# Patient Record
Sex: MALE | Race: WHITE
[De-identification: names, ages, dates, MRNs, and addresses within clinical notes are randomized per-mention and may not be internally consistent; named-entity substitution may affect disease eponyms.]

---

## 2017-01-03 ENCOUNTER — HOSPITAL ENCOUNTER (OUTPATIENT)
Dept: HOSPITAL 83 - WOUNDCARE | Age: 47
End: 2017-01-03
Attending: PODIATRIST
Payer: MEDICARE

## 2017-01-03 DIAGNOSIS — L88: ICD-10-CM

## 2017-01-03 DIAGNOSIS — L97.811: ICD-10-CM

## 2017-01-03 DIAGNOSIS — I87.011: Primary | ICD-10-CM

## 2017-03-02 LAB
ABSOLUTE BASO #: 0 10*3/UL (ref 0–0.1)
ABSOLUTE EOS #: 0.1 10*3/UL (ref 0–0.4)
ABSOLUTE NEUT #: 7.4 10*3/UL (ref 2.3–7.9)
ALBUMIN: 4 GM/DL (ref 3.1–4.5)
ALP BLD-CCNC: 76 U/L (ref 45–117)
ALT SERPL-CCNC: 22 U/L (ref 12–78)
AST SERPL-CCNC: 15 IU/L (ref 3–35)
BASOPHILS %: 0.3 % (ref 0–1)
BILIRUB SERPL-MCNC: 0.3 MG/DL (ref 0.2–1)
BUN BLDV-MCNC: 11 MG/DL (ref 7–24)
C-REACTIVE PROTEIN: 0.37 MG/DL (ref 0–0.3)
CALCIUM SERPL-MCNC: 8.9 MG/DL (ref 8.5–10.5)
CHLORIDE BLD-SCNC: 99 MMOL/L (ref 98–107)
CO2: 31 MMOL/L (ref 21–32)
CREAT SERPL-MCNC: 1.07 MG/DL (ref 0.7–1.3)
EOSINOPHILS %: 0.8 % (ref 1–4)
ERYTHROCYTE SEDIMENTATION RATE: 13 MM/HR (ref 0–15)
GFR AFRICAN AMERICAN: > 60 ML/MIN
GFR SERPL CREATININE-BSD FRML MDRD: >60 ML/MIN/
GLUCOSE: 174 MG/DL (ref 65–99)
HCT VFR BLD CALC: 39 % (ref 42–52)
HEMOGLOBIN: 13.1 G/DL (ref 14–18)
IMMATURE GRANULOCYTES #: 0.1 10*3/UL (ref 0–0.1)
IMMATURE GRANULOCYTES: 0.9 % (ref 0–1)
LYMPHOCYTE %: 15.8 % (ref 27–41)
LYMPHOCYTES # BLD: 1.5 10*3/UL (ref 1.3–4.4)
MCH RBC QN AUTO: 27.6 PG (ref 27–31)
MCHC RBC AUTO-ENTMCNC: 33.6 G/DL (ref 33–37)
MCV RBC AUTO: 82.1 FL (ref 80–94)
MONOCYTES # BLD: 0.5 10*3/UL (ref 0.1–1)
MONOCYTES %: 5.3 % (ref 3–9)
NEUTROPHILS %: 76.9 % (ref 47–73)
NUCLEATED RED BLOOD CELLS: 0 % (ref 0–0)
PDW BLD-RTO: 13.6 % (ref 0–14.5)
PLATELET # BLD: 244 10*3/UL (ref 130–400)
PMV BLD AUTO: 8.9 FL (ref 9.6–12.3)
POTASSIUM SERPL-SCNC: 4.3 MMOL/L (ref 3.5–5.1)
RBC # BLD: 4.75 10*6/UL (ref 4.5–5.9)
SODIUM BLD-SCNC: 139 MMOL/L (ref 136–145)
TOTAL PROTEIN: 7 GM/DL (ref 6.4–8.2)
WBC # BLD: 9.6 10*3/UL (ref 4.8–10.8)

## 2017-03-06 LAB
ANAEROBIC CULTURE: NORMAL

## 2017-03-17 PROBLEM — Z95.2 H/O MITRAL VALVE REPLACEMENT WITH MECHANICAL VALVE: Chronic | Status: ACTIVE | Noted: 2017-03-17

## 2017-04-17 ENCOUNTER — HOSPITAL ENCOUNTER (EMERGENCY)
Dept: HOSPITAL 83 - ED | Age: 47
LOS: 1 days | Discharge: HOME | End: 2017-04-18
Payer: MEDICARE

## 2017-04-17 VITALS — WEIGHT: 250 LBS | HEIGHT: 72.99 IN | BODY MASS INDEX: 33.13 KG/M2

## 2017-04-17 VITALS — DIASTOLIC BLOOD PRESSURE: 65 MMHG

## 2017-04-17 DIAGNOSIS — Z45.2: Primary | ICD-10-CM

## 2017-04-17 DIAGNOSIS — Z79.899: ICD-10-CM

## 2017-04-17 DIAGNOSIS — Z79.02: ICD-10-CM

## 2017-04-17 DIAGNOSIS — Z79.82: ICD-10-CM

## 2017-04-17 DIAGNOSIS — Z88.8: ICD-10-CM

## 2017-04-17 DIAGNOSIS — Z88.1: ICD-10-CM

## 2017-04-17 DIAGNOSIS — Z88.6: ICD-10-CM

## 2017-04-17 LAB
ALBUMIN SERPL-MCNC: 3.9 GM/DL (ref 3.1–4.5)
ALP SERPL-CCNC: 86 U/L (ref 45–117)
ALT SERPL W P-5'-P-CCNC: 22 U/L (ref 12–78)
AST SERPL-CCNC: 25 IU/L (ref 3–35)
BASOPHILS # BLD AUTO: 0 10*3/UL (ref 0–0.1)
BASOPHILS NFR BLD AUTO: 0.4 % (ref 0–1)
BUN SERPL-MCNC: 8 MG/DL (ref 7–24)
CHLORIDE SERPL-SCNC: 105 MMOL/L (ref 98–107)
CO2 SERPL-SCNC: 28 MMOL/L (ref 21–32)
EOSINOPHIL # BLD AUTO: 0.5 10*3/UL (ref 0–0.4)
EOSINOPHIL # BLD AUTO: 4.8 % (ref 1–4)
ERYTHROCYTE [DISTWIDTH] IN BLOOD BY AUTOMATED COUNT: 13 % (ref 0–14.5)
GLUCOSE SERPL-MCNC: 149 MG/DL (ref 65–99)
HCT VFR BLD AUTO: 36 % (ref 42–52)
HGB BLD-MCNC: 12.1 G/DL (ref 14–18)
IG #: 0 10*3/UL (ref 0–0.1)
INR BLD: 2.4 (ref 2–3.5)
LYMPHOCYTES # BLD AUTO: 1.5 10*3/UL (ref 1.3–4.4)
LYMPHOCYTES NFR BLD AUTO: 16 % (ref 27–41)
MCH RBC QN AUTO: 27.6 PG (ref 27–31)
MCHC RBC AUTO-ENTMCNC: 33.6 G/DL (ref 33–37)
MCV RBC AUTO: 82 FL (ref 80–94)
MONOCYTES # BLD AUTO: 0.6 10*3/UL (ref 0.1–1)
MONOCYTES NFR BLD MANUAL: 6.7 % (ref 3–9)
NEUT #: 6.9 10*3/UL (ref 2.3–7.9)
NEUT %: 71.9 % (ref 47–73)
NRBC BLD QL AUTO: 0 % (ref 0–0)
PLATELET # BLD AUTO: 240 10*3/UL (ref 130–400)
PMV BLD AUTO: 9.2 FL (ref 9.6–12.3)
POTASSIUM SERPL-SCNC: 3.3 MMOL/L (ref 3.5–5.1)
PROT SERPL-MCNC: 7 GM/DL (ref 6.4–8.2)
PROTHROMBIN TIME: 26.8 SECONDS (ref 9–12.4)
RBC # BLD AUTO: 4.39 10*6/UL (ref 4.5–5.9)
SODIUM SERPL-SCNC: 145 MMOL/L (ref 136–145)
VANCOMYCIN TROUGH SERPL-MCNC: 16 UG/ML (ref 10–20)
WBC NRBC COR # BLD AUTO: 9.6 10*3/UL (ref 4.8–10.8)

## 2017-04-18 ENCOUNTER — HOSPITAL ENCOUNTER (OUTPATIENT)
Dept: HOSPITAL 83 - SDC | Age: 47
Discharge: HOME | End: 2017-04-18
Attending: INTERNAL MEDICINE
Payer: MEDICARE

## 2017-04-18 VITALS — SYSTOLIC BLOOD PRESSURE: 138 MMHG | DIASTOLIC BLOOD PRESSURE: 76 MMHG

## 2017-04-18 DIAGNOSIS — Z82.49: ICD-10-CM

## 2017-04-18 DIAGNOSIS — Z83.3: ICD-10-CM

## 2017-04-18 DIAGNOSIS — F32.9: ICD-10-CM

## 2017-04-18 DIAGNOSIS — K21.9: ICD-10-CM

## 2017-04-18 DIAGNOSIS — Z87.01: ICD-10-CM

## 2017-04-18 DIAGNOSIS — Z45.2: Primary | ICD-10-CM

## 2017-04-18 DIAGNOSIS — E11.9: ICD-10-CM

## 2017-04-18 DIAGNOSIS — J44.9: ICD-10-CM

## 2017-04-18 DIAGNOSIS — F41.9: ICD-10-CM

## 2017-04-27 ENCOUNTER — HOSPITAL ENCOUNTER (OUTPATIENT)
Dept: HOSPITAL 83 - WOUNDCARE | Age: 47
End: 2017-04-27
Attending: INTERNAL MEDICINE
Payer: MEDICARE

## 2017-04-27 DIAGNOSIS — F17.210: ICD-10-CM

## 2017-04-27 DIAGNOSIS — F41.9: ICD-10-CM

## 2017-04-27 DIAGNOSIS — L97.814: ICD-10-CM

## 2017-04-27 DIAGNOSIS — Z95.0: ICD-10-CM

## 2017-04-27 DIAGNOSIS — E11.69: ICD-10-CM

## 2017-04-27 DIAGNOSIS — I48.91: ICD-10-CM

## 2017-04-27 DIAGNOSIS — J44.9: ICD-10-CM

## 2017-04-27 DIAGNOSIS — E11.622: Primary | ICD-10-CM

## 2017-04-27 DIAGNOSIS — Z79.01: ICD-10-CM

## 2017-04-27 DIAGNOSIS — F32.9: ICD-10-CM

## 2017-04-27 DIAGNOSIS — G43.909: ICD-10-CM

## 2017-04-27 DIAGNOSIS — M86.361: ICD-10-CM

## 2017-08-01 ENCOUNTER — HOSPITAL ENCOUNTER (OUTPATIENT)
Dept: HOSPITAL 83 - WOUNDCARE | Age: 47
End: 2017-08-01
Attending: INTERNAL MEDICINE
Payer: MEDICARE

## 2017-08-01 DIAGNOSIS — M86.361: ICD-10-CM

## 2017-08-01 DIAGNOSIS — L88: ICD-10-CM

## 2017-08-01 DIAGNOSIS — E11.69: ICD-10-CM

## 2017-08-01 DIAGNOSIS — L97.814: ICD-10-CM

## 2017-08-01 DIAGNOSIS — E11.622: Primary | ICD-10-CM

## 2017-08-11 ENCOUNTER — HOSPITAL ENCOUNTER (OUTPATIENT)
Dept: HOSPITAL 83 - LAB | Age: 47
Discharge: HOME | End: 2017-08-11
Attending: INTERNAL MEDICINE
Payer: MEDICARE

## 2017-08-11 DIAGNOSIS — I48.91: Primary | ICD-10-CM

## 2017-08-11 LAB
INR BLD: 3.1 (ref 2–3.5)
PROTHROMBIN TIME: 35.5 SECONDS (ref 9–12.4)

## 2017-09-25 ENCOUNTER — HOSPITAL ENCOUNTER (OUTPATIENT)
Dept: HOSPITAL 83 - LAB | Age: 47
Discharge: HOME | End: 2017-09-25
Attending: INTERNAL MEDICINE
Payer: MEDICARE

## 2017-09-25 DIAGNOSIS — I48.91: Primary | ICD-10-CM

## 2017-09-25 LAB — INR BLD: 2.1 (ref 2–3.5)

## 2017-10-26 ENCOUNTER — HOSPITAL ENCOUNTER (EMERGENCY)
Dept: HOSPITAL 83 - ED | Age: 47
Discharge: HOME | End: 2017-10-26
Payer: MEDICARE

## 2017-10-26 VITALS — DIASTOLIC BLOOD PRESSURE: 74 MMHG | SYSTOLIC BLOOD PRESSURE: 112 MMHG

## 2017-10-26 VITALS — WEIGHT: 200 LBS | HEIGHT: 70 IN | BODY MASS INDEX: 28.63 KG/M2
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
## 2017-10-26 DIAGNOSIS — F17.200: ICD-10-CM

## 2017-10-26 DIAGNOSIS — Z88.1: ICD-10-CM

## 2017-10-26 DIAGNOSIS — T15.92XA: Primary | ICD-10-CM

## 2017-10-26 DIAGNOSIS — Z88.8: ICD-10-CM
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
## 2017-10-26 DIAGNOSIS — Z91.048: ICD-10-CM

## 2019-01-17 ENCOUNTER — HOSPITAL ENCOUNTER (OUTPATIENT)
Dept: HOSPITAL 83 - CARD | Age: 49
Discharge: HOME | End: 2019-01-17
Attending: INTERNAL MEDICINE
Payer: MEDICARE

## 2019-01-17 DIAGNOSIS — Z95.2: ICD-10-CM

## 2019-01-17 DIAGNOSIS — R00.2: ICD-10-CM

## 2019-01-17 DIAGNOSIS — I51.7: Primary | ICD-10-CM

## 2019-01-23 ENCOUNTER — HOSPITAL ENCOUNTER (OUTPATIENT)
Dept: HOSPITAL 83 - LAB | Age: 49
Discharge: HOME | End: 2019-01-23
Attending: INTERNAL MEDICINE
Payer: MEDICARE

## 2019-01-23 DIAGNOSIS — J44.9: Primary | ICD-10-CM

## 2019-01-24 LAB — A1AT SERPL-MCNC: 118 MG/DL (ref 90–200)

## 2019-01-28 LAB — PHENOTYPE: (no result)

## 2019-08-01 ENCOUNTER — HOSPITAL ENCOUNTER (OUTPATIENT)
Dept: HOSPITAL 83 - CARD | Age: 49
Discharge: HOME | End: 2019-08-01
Attending: INTERNAL MEDICINE
Payer: MEDICARE

## 2019-08-01 DIAGNOSIS — J44.9: ICD-10-CM

## 2019-08-01 DIAGNOSIS — R06.02: Primary | ICD-10-CM

## 2019-08-01 DIAGNOSIS — R53.81: ICD-10-CM

## 2019-08-01 DIAGNOSIS — R07.89: ICD-10-CM

## 2019-08-01 DIAGNOSIS — Z82.49: ICD-10-CM

## 2019-08-01 DIAGNOSIS — Z95.2: ICD-10-CM

## 2019-08-01 DIAGNOSIS — Z95.0: ICD-10-CM

## 2019-09-06 ENCOUNTER — HOSPITAL ENCOUNTER (INPATIENT)
Dept: HOSPITAL 83 - ED | Age: 49
LOS: 2 days | Discharge: HOME | DRG: 863 | End: 2019-09-08
Attending: INTERNAL MEDICINE | Admitting: INTERNAL MEDICINE
Payer: MEDICARE

## 2019-09-06 VITALS — DIASTOLIC BLOOD PRESSURE: 80 MMHG | SYSTOLIC BLOOD PRESSURE: 128 MMHG

## 2019-09-06 VITALS — SYSTOLIC BLOOD PRESSURE: 108 MMHG | DIASTOLIC BLOOD PRESSURE: 74 MMHG

## 2019-09-06 VITALS — DIASTOLIC BLOOD PRESSURE: 69 MMHG | SYSTOLIC BLOOD PRESSURE: 111 MMHG

## 2019-09-06 VITALS — WEIGHT: 255.5 LBS | BODY MASS INDEX: 33.86 KG/M2 | HEIGHT: 73 IN

## 2019-09-06 VITALS — SYSTOLIC BLOOD PRESSURE: 117 MMHG | DIASTOLIC BLOOD PRESSURE: 59 MMHG

## 2019-09-06 VITALS — DIASTOLIC BLOOD PRESSURE: 80 MMHG

## 2019-09-06 DIAGNOSIS — I48.0: ICD-10-CM

## 2019-09-06 DIAGNOSIS — E11.65: ICD-10-CM

## 2019-09-06 DIAGNOSIS — M86.661: ICD-10-CM

## 2019-09-06 DIAGNOSIS — N18.3: ICD-10-CM

## 2019-09-06 DIAGNOSIS — L03.115: ICD-10-CM

## 2019-09-06 DIAGNOSIS — Z83.3: ICD-10-CM

## 2019-09-06 DIAGNOSIS — Z91.09: ICD-10-CM

## 2019-09-06 DIAGNOSIS — Z88.8: ICD-10-CM

## 2019-09-06 DIAGNOSIS — G43.911: ICD-10-CM

## 2019-09-06 DIAGNOSIS — E11.69: ICD-10-CM

## 2019-09-06 DIAGNOSIS — R74.8: ICD-10-CM

## 2019-09-06 DIAGNOSIS — Z80.8: ICD-10-CM

## 2019-09-06 DIAGNOSIS — Z95.0: ICD-10-CM

## 2019-09-06 DIAGNOSIS — T81.49XA: Primary | ICD-10-CM

## 2019-09-06 DIAGNOSIS — F17.210: ICD-10-CM

## 2019-09-06 DIAGNOSIS — E66.9: ICD-10-CM

## 2019-09-06 DIAGNOSIS — F41.1: ICD-10-CM

## 2019-09-06 DIAGNOSIS — Z88.4: ICD-10-CM

## 2019-09-06 DIAGNOSIS — E11.622: ICD-10-CM

## 2019-09-06 DIAGNOSIS — L97.313: ICD-10-CM
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
## 2019-09-06 DIAGNOSIS — Z86.14: ICD-10-CM

## 2019-09-06 DIAGNOSIS — Z79.84: ICD-10-CM

## 2019-09-06 DIAGNOSIS — Z95.2: ICD-10-CM

## 2019-09-06 DIAGNOSIS — Z79.899: ICD-10-CM

## 2019-09-06 DIAGNOSIS — D68.9: ICD-10-CM

## 2019-09-06 DIAGNOSIS — E11.22: ICD-10-CM

## 2019-09-06 DIAGNOSIS — J41.0: ICD-10-CM

## 2019-09-06 DIAGNOSIS — E78.5: ICD-10-CM

## 2019-09-06 DIAGNOSIS — Z79.01: ICD-10-CM
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
## 2019-09-06 DIAGNOSIS — Z84.89: ICD-10-CM

## 2019-09-06 DIAGNOSIS — Z82.49: ICD-10-CM

## 2019-09-06 LAB
ALBUMIN SERPL-MCNC: 3.9 GM/DL (ref 3.1–4.5)
ALP SERPL-CCNC: 141 U/L (ref 45–117)
ALT SERPL W P-5'-P-CCNC: 30 U/L (ref 12–78)
APTT PPP: 36.7 SECONDS (ref 20–32.1)
AST SERPL-CCNC: 16 IU/L (ref 3–35)
BASOPHILS # BLD AUTO: 0.1 10*3/UL (ref 0–0.1)
BASOPHILS NFR BLD AUTO: 0.5 % (ref 0–1)
BUN SERPL-MCNC: 10 MG/DL (ref 7–24)
CHLORIDE SERPL-SCNC: 103 MMOL/L (ref 98–107)
CREAT SERPL-MCNC: 1.43 MG/DL (ref 0.7–1.3)
EOSINOPHIL # BLD AUTO: 0.3 10*3/UL (ref 0–0.4)
EOSINOPHIL # BLD AUTO: 2.9 % (ref 1–4)
ERYTHROCYTE [DISTWIDTH] IN BLOOD BY AUTOMATED COUNT: 13.1 % (ref 0–14.5)
HCT VFR BLD AUTO: 42.1 % (ref 42–52)
HGB BLD-MCNC: 14.1 G/DL (ref 14–18)
INR BLD: 2.1 (ref 2–3.5)
LYMPHOCYTES # BLD AUTO: 2 10*3/UL (ref 1.3–4.4)
LYMPHOCYTES NFR BLD AUTO: 21.4 % (ref 27–41)
MCH RBC QN AUTO: 29.3 PG (ref 27–31)
MCHC RBC AUTO-ENTMCNC: 33.5 G/DL (ref 33–37)
MCV RBC AUTO: 87.3 FL (ref 80–94)
MONOCYTES # BLD AUTO: 0.6 10*3/UL (ref 0.1–1)
MONOCYTES NFR BLD MANUAL: 6.4 % (ref 3–9)
NEUT #: 6.4 10*3/UL (ref 2.3–7.9)
NEUT %: 68.5 % (ref 47–73)
NRBC BLD QL AUTO: 0 10*3/UL (ref 0–0)
PLATELET # BLD AUTO: 261 10*3/UL (ref 130–400)
PMV BLD AUTO: 8.5 FL (ref 9.6–12.3)
POTASSIUM SERPL-SCNC: 3.7 MMOL/L (ref 3.5–5.1)
PROT SERPL-MCNC: 7.5 GM/DL (ref 6.4–8.2)
RBC # BLD AUTO: 4.82 10*6/UL (ref 4.5–5.9)
SODIUM SERPL-SCNC: 137 MMOL/L (ref 136–145)
WBC NRBC COR # BLD AUTO: 9.3 10*3/UL (ref 4.8–10.8)

## 2019-09-07 VITALS — DIASTOLIC BLOOD PRESSURE: 77 MMHG

## 2019-09-07 VITALS — SYSTOLIC BLOOD PRESSURE: 116 MMHG | DIASTOLIC BLOOD PRESSURE: 78 MMHG

## 2019-09-07 VITALS — DIASTOLIC BLOOD PRESSURE: 74 MMHG | SYSTOLIC BLOOD PRESSURE: 128 MMHG

## 2019-09-07 VITALS — DIASTOLIC BLOOD PRESSURE: 63 MMHG

## 2019-09-07 VITALS — DIASTOLIC BLOOD PRESSURE: 57 MMHG

## 2019-09-07 LAB
ALBUMIN SERPL-MCNC: 3.5 GM/DL (ref 3.1–4.5)
ALP SERPL-CCNC: 131 U/L (ref 45–117)
ALT SERPL W P-5'-P-CCNC: 26 U/L (ref 12–78)
AST SERPL-CCNC: 20 IU/L (ref 3–35)
BASOPHILS # BLD AUTO: 0 10*3/UL (ref 0–0.1)
BASOPHILS NFR BLD AUTO: 0.5 % (ref 0–1)
BUN SERPL-MCNC: 10 MG/DL (ref 7–24)
CHLORIDE SERPL-SCNC: 104 MMOL/L (ref 98–107)
CHOLEST SERPL-MCNC: 144 MG/DL (ref ?–200)
CREAT SERPL-MCNC: 1.31 MG/DL (ref 0.7–1.3)
EOSINOPHIL # BLD AUTO: 0.3 10*3/UL (ref 0–0.4)
EOSINOPHIL # BLD AUTO: 4 % (ref 1–4)
ERYTHROCYTE [DISTWIDTH] IN BLOOD BY AUTOMATED COUNT: 13.1 % (ref 0–14.5)
HCT VFR BLD AUTO: 42.2 % (ref 42–52)
HDLC SERPL-MCNC: 35 MG/DL (ref 40–60)
HGB BLD-MCNC: 13.7 G/DL (ref 14–18)
INR BLD: 2.1 (ref 2–3.5)
LDLC SERPL DIRECT ASSAY-MCNC: 53 MG/DL (ref 9–159)
LYMPHOCYTES # BLD AUTO: 1.8 10*3/UL (ref 1.3–4.4)
LYMPHOCYTES NFR BLD AUTO: 29.4 % (ref 27–41)
MCH RBC QN AUTO: 28.8 PG (ref 27–31)
MCHC RBC AUTO-ENTMCNC: 32.5 G/DL (ref 33–37)
MCV RBC AUTO: 88.8 FL (ref 80–94)
MONOCYTES # BLD AUTO: 0.5 10*3/UL (ref 0.1–1)
MONOCYTES NFR BLD MANUAL: 8.5 % (ref 3–9)
NEUT #: 3.6 10*3/UL (ref 2.3–7.9)
NEUT %: 57.3 % (ref 47–73)
NRBC BLD QL AUTO: 0 % (ref 0–0)
PHOSPHATE SERPL-MCNC: 4.3 MG/DL (ref 2.5–4.9)
PLATELET # BLD AUTO: 232 10*3/UL (ref 130–400)
PMV BLD AUTO: 9.2 FL (ref 9.6–12.3)
POTASSIUM SERPL-SCNC: 3.6 MMOL/L (ref 3.5–5.1)
PROT SERPL-MCNC: 6.7 GM/DL (ref 6.4–8.2)
RBC # BLD AUTO: 4.75 10*6/UL (ref 4.5–5.9)
SODIUM SERPL-SCNC: 141 MMOL/L (ref 136–145)
TRIGL SERPL-MCNC: 281 MG/DL (ref ?–150)
VITAMIN B12: 257 PG/ML (ref 247–911)
VLDLC SERPL CALC-MCNC: 56 MG/DL (ref 6–40)
WBC NRBC COR # BLD AUTO: 6.3 10*3/UL (ref 4.8–10.8)

## 2019-09-08 VITALS — SYSTOLIC BLOOD PRESSURE: 105 MMHG | DIASTOLIC BLOOD PRESSURE: 64 MMHG

## 2019-09-08 VITALS — SYSTOLIC BLOOD PRESSURE: 107 MMHG | DIASTOLIC BLOOD PRESSURE: 65 MMHG

## 2019-09-08 LAB
BASOPHILS # BLD AUTO: 0 10*3/UL (ref 0–0.1)
BASOPHILS NFR BLD AUTO: 0.4 % (ref 0–1)
BUN SERPL-MCNC: 10 MG/DL (ref 7–24)
CHLORIDE SERPL-SCNC: 101 MMOL/L (ref 98–107)
CREAT SERPL-MCNC: 1.25 MG/DL (ref 0.7–1.3)
EOSINOPHIL # BLD AUTO: 0.2 10*3/UL (ref 0–0.4)
EOSINOPHIL # BLD AUTO: 3.3 % (ref 1–4)
ERYTHROCYTE [DISTWIDTH] IN BLOOD BY AUTOMATED COUNT: 13 % (ref 0–14.5)
HCT VFR BLD AUTO: 40 % (ref 42–52)
HGB BLD-MCNC: 13.2 G/DL (ref 14–18)
INR BLD: 2.3 (ref 2–3.5)
LYMPHOCYTES # BLD AUTO: 1.8 10*3/UL (ref 1.3–4.4)
LYMPHOCYTES NFR BLD AUTO: 25.9 % (ref 27–41)
MCH RBC QN AUTO: 28.8 PG (ref 27–31)
MCHC RBC AUTO-ENTMCNC: 33 G/DL (ref 33–37)
MCV RBC AUTO: 87.3 FL (ref 80–94)
MONOCYTES # BLD AUTO: 0.6 10*3/UL (ref 0.1–1)
MONOCYTES NFR BLD MANUAL: 8.3 % (ref 3–9)
NEUT #: 4.3 10*3/UL (ref 2.3–7.9)
NEUT %: 61.8 % (ref 47–73)
NRBC BLD QL AUTO: 0 % (ref 0–0)
PLATELET # BLD AUTO: 230 10*3/UL (ref 130–400)
PMV BLD AUTO: 9.3 FL (ref 9.6–12.3)
POTASSIUM SERPL-SCNC: 3.3 MMOL/L (ref 3.5–5.1)
RBC # BLD AUTO: 4.58 10*6/UL (ref 4.5–5.9)
SODIUM SERPL-SCNC: 138 MMOL/L (ref 136–145)
WBC NRBC COR # BLD AUTO: 7 10*3/UL (ref 4.8–10.8)

## 2019-09-18 ENCOUNTER — TELEPHONE (OUTPATIENT)
Dept: ORTHOPEDIC SURGERY | Age: 49
End: 2019-09-18

## 2019-09-18 ENCOUNTER — HOSPITAL ENCOUNTER (EMERGENCY)
Dept: HOSPITAL 83 - ED | Age: 49
Discharge: HOME | End: 2019-09-18
Payer: MEDICARE

## 2019-09-18 VITALS — WEIGHT: 252 LBS | HEIGHT: 72.99 IN | BODY MASS INDEX: 33.4 KG/M2

## 2019-09-18 VITALS — DIASTOLIC BLOOD PRESSURE: 73 MMHG | SYSTOLIC BLOOD PRESSURE: 114 MMHG

## 2019-09-18 DIAGNOSIS — Z91.048: ICD-10-CM

## 2019-09-18 DIAGNOSIS — E78.5: ICD-10-CM

## 2019-09-18 DIAGNOSIS — E66.9: ICD-10-CM

## 2019-09-18 DIAGNOSIS — Z79.2: ICD-10-CM

## 2019-09-18 DIAGNOSIS — I48.91: ICD-10-CM

## 2019-09-18 DIAGNOSIS — Z88.8: ICD-10-CM

## 2019-09-18 DIAGNOSIS — E11.22: ICD-10-CM

## 2019-09-18 DIAGNOSIS — J44.9: ICD-10-CM

## 2019-09-18 DIAGNOSIS — Z88.1: ICD-10-CM

## 2019-09-18 DIAGNOSIS — N18.3: ICD-10-CM

## 2019-09-18 DIAGNOSIS — Z79.01: ICD-10-CM

## 2019-09-18 DIAGNOSIS — E11.622: Primary | ICD-10-CM

## 2019-09-18 DIAGNOSIS — F17.200: ICD-10-CM

## 2019-09-18 DIAGNOSIS — G43.909: ICD-10-CM

## 2019-09-18 DIAGNOSIS — L03.115: ICD-10-CM

## 2019-09-18 DIAGNOSIS — L97.919: ICD-10-CM

## 2019-09-18 DIAGNOSIS — Z79.899: ICD-10-CM

## 2019-09-18 DIAGNOSIS — Z88.4: ICD-10-CM

## 2019-09-18 DIAGNOSIS — M86.8X6: ICD-10-CM

## 2019-09-18 LAB
ALBUMIN SERPL-MCNC: 3.7 GM/DL (ref 3.1–4.5)
ALP SERPL-CCNC: 141 U/L (ref 45–117)
ALT SERPL W P-5'-P-CCNC: 23 U/L (ref 12–78)
APTT PPP: 51.8 SECONDS (ref 20–32.1)
AST SERPL-CCNC: 18 IU/L (ref 3–35)
BASOPHILS # BLD AUTO: 0 10*3/UL (ref 0–0.1)
BASOPHILS NFR BLD AUTO: 0.5 % (ref 0–1)
BUN SERPL-MCNC: 8 MG/DL (ref 7–24)
CHLORIDE SERPL-SCNC: 105 MMOL/L (ref 98–107)
CREAT SERPL-MCNC: 1.63 MG/DL (ref 0.7–1.3)
EOSINOPHIL # BLD AUTO: 0.3 10*3/UL (ref 0–0.4)
EOSINOPHIL # BLD AUTO: 3.4 % (ref 1–4)
ERYTHROCYTE [DISTWIDTH] IN BLOOD BY AUTOMATED COUNT: 13.3 % (ref 0–14.5)
HCT VFR BLD AUTO: 40.1 % (ref 42–52)
HGB BLD-MCNC: 13.2 G/DL (ref 14–18)
INR BLD: 4.5 (ref 2–3.5)
LIPASE SERPL-CCNC: 344 U/L (ref 73–393)
LYMPHOCYTES # BLD AUTO: 1.8 10*3/UL (ref 1.3–4.4)
LYMPHOCYTES NFR BLD AUTO: 22.2 % (ref 27–41)
MCH RBC QN AUTO: 29.1 PG (ref 27–31)
MCHC RBC AUTO-ENTMCNC: 32.9 G/DL (ref 33–37)
MCV RBC AUTO: 88.3 FL (ref 80–94)
MONOCYTES # BLD AUTO: 0.5 10*3/UL (ref 0.1–1)
MONOCYTES NFR BLD MANUAL: 5.5 % (ref 3–9)
NEUT #: 5.5 10*3/UL (ref 2.3–7.9)
NEUT %: 67.9 % (ref 47–73)
NRBC BLD QL AUTO: 0 10*3/UL (ref 0–0)
PLATELET # BLD AUTO: 273 10*3/UL (ref 130–400)
PMV BLD AUTO: 8.8 FL (ref 9.6–12.3)
POTASSIUM SERPL-SCNC: 3.7 MMOL/L (ref 3.5–5.1)
PROT SERPL-MCNC: 7.2 GM/DL (ref 6.4–8.2)
RBC # BLD AUTO: 4.54 10*6/UL (ref 4.5–5.9)
SODIUM SERPL-SCNC: 138 MMOL/L (ref 136–145)
TROPONIN I SERPL-MCNC: 0.02 NG/ML (ref ?–0.04)
WBC NRBC COR # BLD AUTO: 8.1 10*3/UL (ref 4.8–10.8)

## 2019-09-23 LAB
BASOPHILS # BLD AUTO: 0.1 10*3/UL (ref 0–0.1)
BASOPHILS NFR BLD AUTO: 0.6 % (ref 0–1)
BUN SERPL-MCNC: 12 MG/DL (ref 7–24)
CHLORIDE SERPL-SCNC: 104 MMOL/L (ref 98–107)
CREAT SERPL-MCNC: 1.68 MG/DL (ref 0.7–1.3)
EOSINOPHIL # BLD AUTO: 0.3 10*3/UL (ref 0–0.4)
EOSINOPHIL # BLD AUTO: 3.5 % (ref 1–4)
ERYTHROCYTE [DISTWIDTH] IN BLOOD BY AUTOMATED COUNT: 13.2 % (ref 0–14.5)
HCT VFR BLD AUTO: 48 % (ref 42–52)
HGB BLD-MCNC: 15.4 G/DL (ref 14–18)
LYMPHOCYTES # BLD AUTO: 1.6 10*3/UL (ref 1.3–4.4)
LYMPHOCYTES NFR BLD AUTO: 18.6 % (ref 27–41)
MCH RBC QN AUTO: 28.8 PG (ref 27–31)
MCHC RBC AUTO-ENTMCNC: 32.1 G/DL (ref 33–37)
MCV RBC AUTO: 89.7 FL (ref 80–94)
MONOCYTES # BLD AUTO: 0.6 10*3/UL (ref 0.1–1)
MONOCYTES NFR BLD MANUAL: 6.7 % (ref 3–9)
NEUT #: 6.2 10*3/UL (ref 2.3–7.9)
NEUT %: 70.3 % (ref 47–73)
NRBC BLD QL AUTO: 0 10*3/UL (ref 0–0)
PLATELET # BLD AUTO: 306 10*3/UL (ref 130–400)
PMV BLD AUTO: 9.2 FL (ref 9.6–12.3)
POTASSIUM SERPL-SCNC: 4.1 MMOL/L (ref 3.5–5.1)
RBC # BLD AUTO: 5.35 10*6/UL (ref 4.5–5.9)
SODIUM SERPL-SCNC: 137 MMOL/L (ref 136–145)
WBC NRBC COR # BLD AUTO: 8.8 10*3/UL (ref 4.8–10.8)

## 2019-09-25 ENCOUNTER — HOSPITAL ENCOUNTER (INPATIENT)
Dept: HOSPITAL 83 - SDC | Age: 49
LOS: 7 days | Discharge: HOME | DRG: 856 | End: 2019-10-02
Attending: INTERNAL MEDICINE | Admitting: INTERNAL MEDICINE
Payer: MEDICARE

## 2019-09-25 VITALS — DIASTOLIC BLOOD PRESSURE: 79 MMHG | SYSTOLIC BLOOD PRESSURE: 134 MMHG

## 2019-09-25 VITALS — SYSTOLIC BLOOD PRESSURE: 117 MMHG | DIASTOLIC BLOOD PRESSURE: 72 MMHG

## 2019-09-25 VITALS — DIASTOLIC BLOOD PRESSURE: 72 MMHG

## 2019-09-25 VITALS — SYSTOLIC BLOOD PRESSURE: 113 MMHG | DIASTOLIC BLOOD PRESSURE: 69 MMHG

## 2019-09-25 VITALS — DIASTOLIC BLOOD PRESSURE: 86 MMHG

## 2019-09-25 VITALS — WEIGHT: 268.38 LBS | HEIGHT: 73 IN | BODY MASS INDEX: 35.57 KG/M2

## 2019-09-25 VITALS — SYSTOLIC BLOOD PRESSURE: 142 MMHG | DIASTOLIC BLOOD PRESSURE: 88 MMHG

## 2019-09-25 VITALS — DIASTOLIC BLOOD PRESSURE: 76 MMHG

## 2019-09-25 VITALS — DIASTOLIC BLOOD PRESSURE: 82 MMHG

## 2019-09-25 VITALS — DIASTOLIC BLOOD PRESSURE: 74 MMHG

## 2019-09-25 DIAGNOSIS — Z88.4: ICD-10-CM

## 2019-09-25 DIAGNOSIS — Z86.14: ICD-10-CM

## 2019-09-25 DIAGNOSIS — E11.69: ICD-10-CM

## 2019-09-25 DIAGNOSIS — M86.661: ICD-10-CM
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
## 2019-09-25 DIAGNOSIS — Z88.6: ICD-10-CM

## 2019-09-25 DIAGNOSIS — E11.65: ICD-10-CM
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
## 2019-09-25 DIAGNOSIS — Z80.8: ICD-10-CM

## 2019-09-25 DIAGNOSIS — D64.9: ICD-10-CM

## 2019-09-25 DIAGNOSIS — Z95.2: ICD-10-CM

## 2019-09-25 DIAGNOSIS — Z88.8: ICD-10-CM

## 2019-09-25 DIAGNOSIS — L03.115: ICD-10-CM

## 2019-09-25 DIAGNOSIS — Z82.49: ICD-10-CM

## 2019-09-25 DIAGNOSIS — E66.9: ICD-10-CM

## 2019-09-25 DIAGNOSIS — E55.9: ICD-10-CM

## 2019-09-25 DIAGNOSIS — E78.5: ICD-10-CM

## 2019-09-25 DIAGNOSIS — N18.3: ICD-10-CM

## 2019-09-25 DIAGNOSIS — E66.01: ICD-10-CM

## 2019-09-25 DIAGNOSIS — J44.9: ICD-10-CM

## 2019-09-25 DIAGNOSIS — I48.91: ICD-10-CM

## 2019-09-25 DIAGNOSIS — G43.909: ICD-10-CM

## 2019-09-25 DIAGNOSIS — Z79.01: ICD-10-CM

## 2019-09-25 DIAGNOSIS — N17.0: ICD-10-CM

## 2019-09-25 DIAGNOSIS — Z91.09: ICD-10-CM

## 2019-09-25 DIAGNOSIS — E11.22: ICD-10-CM

## 2019-09-25 DIAGNOSIS — Z87.891: ICD-10-CM

## 2019-09-25 DIAGNOSIS — Z79.899: ICD-10-CM

## 2019-09-25 DIAGNOSIS — Z88.5: ICD-10-CM

## 2019-09-25 DIAGNOSIS — R79.1: ICD-10-CM

## 2019-09-25 DIAGNOSIS — E11.621: ICD-10-CM

## 2019-09-25 DIAGNOSIS — M19.071: ICD-10-CM

## 2019-09-25 DIAGNOSIS — L97.919: ICD-10-CM

## 2019-09-25 DIAGNOSIS — F41.1: ICD-10-CM

## 2019-09-25 DIAGNOSIS — T81.49XA: Primary | ICD-10-CM

## 2019-09-25 DIAGNOSIS — Z83.3: ICD-10-CM

## 2019-09-25 DIAGNOSIS — M86.161: ICD-10-CM

## 2019-09-25 LAB — INR BLD: 1 (ref 2–3.5)

## 2019-09-25 PROCEDURE — 0QBG0ZZ EXCISION OF RIGHT TIBIA, OPEN APPROACH: ICD-10-PCS | Performed by: PODIATRIST

## 2019-09-25 PROCEDURE — 0QBG0ZX EXCISION OF RIGHT TIBIA, OPEN APPROACH, DIAGNOSTIC: ICD-10-PCS | Performed by: PODIATRIST

## 2019-09-26 VITALS — DIASTOLIC BLOOD PRESSURE: 72 MMHG | SYSTOLIC BLOOD PRESSURE: 108 MMHG

## 2019-09-26 VITALS — SYSTOLIC BLOOD PRESSURE: 119 MMHG | DIASTOLIC BLOOD PRESSURE: 79 MMHG

## 2019-09-26 VITALS — SYSTOLIC BLOOD PRESSURE: 115 MMHG | DIASTOLIC BLOOD PRESSURE: 71 MMHG

## 2019-09-26 VITALS — DIASTOLIC BLOOD PRESSURE: 67 MMHG

## 2019-09-26 VITALS — DIASTOLIC BLOOD PRESSURE: 65 MMHG

## 2019-09-26 LAB
ALBUMIN SERPL-MCNC: 3.2 GM/DL (ref 3.1–4.5)
ALP SERPL-CCNC: 119 U/L (ref 45–117)
ALT SERPL W P-5'-P-CCNC: 18 U/L (ref 12–78)
AST SERPL-CCNC: 15 IU/L (ref 3–35)
BASOPHILS # BLD AUTO: 0 10*3/UL (ref 0–0.1)
BASOPHILS NFR BLD AUTO: 0.4 % (ref 0–1)
BUN SERPL-MCNC: 11 MG/DL (ref 7–24)
CHLORIDE SERPL-SCNC: 105 MMOL/L (ref 98–107)
CREAT SERPL-MCNC: 1.4 MG/DL (ref 0.7–1.3)
EOSINOPHIL # BLD AUTO: 0.3 10*3/UL (ref 0–0.4)
EOSINOPHIL # BLD AUTO: 3.8 % (ref 1–4)
ERYTHROCYTE [DISTWIDTH] IN BLOOD BY AUTOMATED COUNT: 13.2 % (ref 0–14.5)
HCT VFR BLD AUTO: 37.9 % (ref 42–52)
HGB BLD-MCNC: 12.1 G/DL (ref 14–18)
LYMPHOCYTES # BLD AUTO: 1.4 10*3/UL (ref 1.3–4.4)
LYMPHOCYTES NFR BLD AUTO: 19.7 % (ref 27–41)
MCH RBC QN AUTO: 29.1 PG (ref 27–31)
MCHC RBC AUTO-ENTMCNC: 31.9 G/DL (ref 33–37)
MCV RBC AUTO: 91.1 FL (ref 80–94)
MONOCYTES # BLD AUTO: 0.6 10*3/UL (ref 0.1–1)
MONOCYTES NFR BLD MANUAL: 9 % (ref 3–9)
NEUT #: 4.7 10*3/UL (ref 2.3–7.9)
NEUT %: 66.7 % (ref 47–73)
NRBC BLD QL AUTO: 0 % (ref 0–0)
PHOSPHATE SERPL-MCNC: 3.1 MG/DL (ref 2.5–4.9)
PLATELET # BLD AUTO: 223 10*3/UL (ref 130–400)
PMV BLD AUTO: 9.4 FL (ref 9.6–12.3)
POTASSIUM SERPL-SCNC: 4 MMOL/L (ref 3.5–5.1)
PROT SERPL-MCNC: 6.3 GM/DL (ref 6.4–8.2)
RBC # BLD AUTO: 4.16 10*6/UL (ref 4.5–5.9)
SODIUM SERPL-SCNC: 138 MMOL/L (ref 136–145)
SPECIMEN PREPARATION: (no result)
TSH SERPL DL<=0.005 MIU/L-ACNC: 2.6 UIU/ML (ref 0.36–4.75)
WBC NRBC COR # BLD AUTO: 7 10*3/UL (ref 4.8–10.8)

## 2019-09-27 VITALS — DIASTOLIC BLOOD PRESSURE: 82 MMHG | SYSTOLIC BLOOD PRESSURE: 116 MMHG

## 2019-09-27 VITALS — SYSTOLIC BLOOD PRESSURE: 123 MMHG | DIASTOLIC BLOOD PRESSURE: 70 MMHG

## 2019-09-27 VITALS — SYSTOLIC BLOOD PRESSURE: 104 MMHG | DIASTOLIC BLOOD PRESSURE: 64 MMHG

## 2019-09-27 VITALS — SYSTOLIC BLOOD PRESSURE: 123 MMHG | DIASTOLIC BLOOD PRESSURE: 81 MMHG

## 2019-09-27 VITALS — DIASTOLIC BLOOD PRESSURE: 74 MMHG

## 2019-09-27 LAB
ALBUMIN SERPL-MCNC: 3.3 GM/DL (ref 3.1–4.5)
ALP SERPL-CCNC: 111 U/L (ref 45–117)
ALT SERPL W P-5'-P-CCNC: 15 U/L (ref 12–78)
AST SERPL-CCNC: 13 IU/L (ref 3–35)
BASOPHILS # BLD AUTO: 0 10*3/UL (ref 0–0.1)
BASOPHILS NFR BLD AUTO: 0.4 % (ref 0–1)
BUN SERPL-MCNC: 11 MG/DL (ref 7–24)
CHLORIDE SERPL-SCNC: 107 MMOL/L (ref 98–107)
CREAT SERPL-MCNC: 1.46 MG/DL (ref 0.7–1.3)
EOSINOPHIL # BLD AUTO: 0.2 10*3/UL (ref 0–0.4)
EOSINOPHIL # BLD AUTO: 3.4 % (ref 1–4)
ERYTHROCYTE [DISTWIDTH] IN BLOOD BY AUTOMATED COUNT: 13.1 % (ref 0–14.5)
HCT VFR BLD AUTO: 36.9 % (ref 42–52)
HGB BLD-MCNC: 11.7 G/DL (ref 14–18)
LYMPHOCYTES # BLD AUTO: 1.4 10*3/UL (ref 1.3–4.4)
LYMPHOCYTES NFR BLD AUTO: 19.9 % (ref 27–41)
MCH RBC QN AUTO: 28.5 PG (ref 27–31)
MCHC RBC AUTO-ENTMCNC: 31.7 G/DL (ref 33–37)
MCV RBC AUTO: 89.8 FL (ref 80–94)
MONOCYTES # BLD AUTO: 0.6 10*3/UL (ref 0.1–1)
MONOCYTES NFR BLD MANUAL: 9.1 % (ref 3–9)
NEUT #: 4.5 10*3/UL (ref 2.3–7.9)
NEUT %: 67.1 % (ref 47–73)
NRBC BLD QL AUTO: 0 10*3/UL (ref 0–0)
PLATELET # BLD AUTO: 214 10*3/UL (ref 130–400)
PMV BLD AUTO: 9.5 FL (ref 9.6–12.3)
POTASSIUM SERPL-SCNC: 4.2 MMOL/L (ref 3.5–5.1)
PROT SERPL-MCNC: 6.6 GM/DL (ref 6.4–8.2)
RBC # BLD AUTO: 4.11 10*6/UL (ref 4.5–5.9)
SODIUM SERPL-SCNC: 138 MMOL/L (ref 136–145)
WBC NRBC COR # BLD AUTO: 6.8 10*3/UL (ref 4.8–10.8)

## 2019-09-28 VITALS — DIASTOLIC BLOOD PRESSURE: 78 MMHG

## 2019-09-28 VITALS — SYSTOLIC BLOOD PRESSURE: 140 MMHG | DIASTOLIC BLOOD PRESSURE: 88 MMHG

## 2019-09-28 VITALS — DIASTOLIC BLOOD PRESSURE: 75 MMHG

## 2019-09-28 VITALS — DIASTOLIC BLOOD PRESSURE: 49 MMHG

## 2019-09-28 VITALS — DIASTOLIC BLOOD PRESSURE: 80 MMHG

## 2019-09-28 LAB — INR BLD: 1.1 (ref 2–3.5)

## 2019-09-29 VITALS — DIASTOLIC BLOOD PRESSURE: 74 MMHG

## 2019-09-29 VITALS — DIASTOLIC BLOOD PRESSURE: 73 MMHG

## 2019-09-29 VITALS — SYSTOLIC BLOOD PRESSURE: 111 MMHG | DIASTOLIC BLOOD PRESSURE: 74 MMHG

## 2019-09-29 VITALS — DIASTOLIC BLOOD PRESSURE: 53 MMHG | SYSTOLIC BLOOD PRESSURE: 92 MMHG

## 2019-09-29 VITALS — DIASTOLIC BLOOD PRESSURE: 77 MMHG | SYSTOLIC BLOOD PRESSURE: 117 MMHG

## 2019-09-29 LAB — INR BLD: 1.3 (ref 2–3.5)

## 2019-09-30 VITALS — DIASTOLIC BLOOD PRESSURE: 61 MMHG | SYSTOLIC BLOOD PRESSURE: 110 MMHG

## 2019-09-30 VITALS — DIASTOLIC BLOOD PRESSURE: 67 MMHG

## 2019-09-30 VITALS — SYSTOLIC BLOOD PRESSURE: 120 MMHG | DIASTOLIC BLOOD PRESSURE: 65 MMHG

## 2019-09-30 VITALS — DIASTOLIC BLOOD PRESSURE: 64 MMHG

## 2019-09-30 VITALS — DIASTOLIC BLOOD PRESSURE: 75 MMHG

## 2019-09-30 LAB — INR BLD: 1.4 (ref 2–3.5)

## 2019-10-01 VITALS — DIASTOLIC BLOOD PRESSURE: 68 MMHG

## 2019-10-01 VITALS — DIASTOLIC BLOOD PRESSURE: 73 MMHG | SYSTOLIC BLOOD PRESSURE: 113 MMHG

## 2019-10-01 VITALS — DIASTOLIC BLOOD PRESSURE: 72 MMHG | SYSTOLIC BLOOD PRESSURE: 147 MMHG

## 2019-10-01 VITALS — DIASTOLIC BLOOD PRESSURE: 78 MMHG

## 2019-10-01 VITALS — DIASTOLIC BLOOD PRESSURE: 71 MMHG

## 2019-10-01 LAB
AMPHETAMINES UR QL SCN: < 1000
BARBITURATES UR QL SCN: < 200
BASOPHILS # BLD AUTO: 0 10*3/UL (ref 0–0.1)
BASOPHILS NFR BLD AUTO: 0.6 % (ref 0–1)
BENZODIAZ UR QL SCN: > 200
BUN SERPL-MCNC: 11 MG/DL (ref 7–24)
BZE UR QL SCN: < 300
CANNABINOIDS UR QL SCN: < 50
CHLORIDE SERPL-SCNC: 105 MMOL/L (ref 98–107)
CREAT SERPL-MCNC: 1.28 MG/DL (ref 0.7–1.3)
EOSINOPHIL # BLD AUTO: 0.5 10*3/UL (ref 0–0.4)
EOSINOPHIL # BLD AUTO: 6.2 % (ref 1–4)
ERYTHROCYTE [DISTWIDTH] IN BLOOD BY AUTOMATED COUNT: 13.5 % (ref 0–14.5)
HCT VFR BLD AUTO: 39 % (ref 42–52)
HGB BLD-MCNC: 12.2 G/DL (ref 14–18)
INR BLD: 1.4 (ref 2–3.5)
LYMPHOCYTES # BLD AUTO: 1.7 10*3/UL (ref 1.3–4.4)
LYMPHOCYTES NFR BLD AUTO: 23.9 % (ref 27–41)
MCH RBC QN AUTO: 28.4 PG (ref 27–31)
MCHC RBC AUTO-ENTMCNC: 31.3 G/DL (ref 33–37)
MCV RBC AUTO: 90.9 FL (ref 80–94)
METHADONE UR QL SCN: < 300
MONOCYTES # BLD AUTO: 0.6 10*3/UL (ref 0.1–1)
MONOCYTES NFR BLD MANUAL: 8.2 % (ref 3–9)
NEUT #: 4.4 10*3/UL (ref 2.3–7.9)
NEUT %: 60.7 % (ref 47–73)
NRBC BLD QL AUTO: 0 10*3/UL (ref 0–0)
OPIATES UR QL SCN: < 300
PCP UR QL SCN: <  25
PLATELET # BLD AUTO: 259 10*3/UL (ref 130–400)
PMV BLD AUTO: 9.1 FL (ref 9.6–12.3)
POTASSIUM SERPL-SCNC: 4.1 MMOL/L (ref 3.5–5.1)
RBC # BLD AUTO: 4.29 10*6/UL (ref 4.5–5.9)
SODIUM SERPL-SCNC: 139 MMOL/L (ref 136–145)
WBC NRBC COR # BLD AUTO: 7.2 10*3/UL (ref 4.8–10.8)

## 2019-10-01 PROCEDURE — 02HV33Z INSERTION OF INFUSION DEVICE INTO SUPERIOR VENA CAVA, PERCUTANEOUS APPROACH: ICD-10-PCS | Performed by: INTERNAL MEDICINE

## 2019-10-02 VITALS — SYSTOLIC BLOOD PRESSURE: 122 MMHG | DIASTOLIC BLOOD PRESSURE: 87 MMHG

## 2019-10-02 VITALS — DIASTOLIC BLOOD PRESSURE: 68 MMHG

## 2019-10-02 VITALS — DIASTOLIC BLOOD PRESSURE: 66 MMHG

## 2019-10-02 VITALS — DIASTOLIC BLOOD PRESSURE: 76 MMHG

## 2019-10-02 LAB — INR BLD: 1.4 (ref 2–3.5)

## 2019-10-05 ENCOUNTER — HOSPITAL ENCOUNTER (EMERGENCY)
Dept: HOSPITAL 83 - ED | Age: 49
Discharge: HOME | End: 2019-10-05
Payer: MEDICARE

## 2019-10-05 VITALS — HEIGHT: 72.99 IN | WEIGHT: 250 LBS | BODY MASS INDEX: 33.13 KG/M2

## 2019-10-05 VITALS — DIASTOLIC BLOOD PRESSURE: 88 MMHG | SYSTOLIC BLOOD PRESSURE: 134 MMHG

## 2019-10-05 DIAGNOSIS — Z88.6: ICD-10-CM

## 2019-10-05 DIAGNOSIS — L53.9: Primary | ICD-10-CM

## 2019-10-05 DIAGNOSIS — F17.200: ICD-10-CM

## 2019-10-05 DIAGNOSIS — Z79.01: ICD-10-CM

## 2019-10-05 DIAGNOSIS — Z79.899: ICD-10-CM

## 2019-10-05 DIAGNOSIS — Z48.00: ICD-10-CM

## 2019-10-05 DIAGNOSIS — Z91.048: ICD-10-CM

## 2019-10-05 DIAGNOSIS — Z88.5: ICD-10-CM

## 2019-10-05 DIAGNOSIS — Z88.8: ICD-10-CM

## 2019-10-05 DIAGNOSIS — Z79.2: ICD-10-CM

## 2019-10-05 DIAGNOSIS — L98.9: ICD-10-CM

## 2019-10-05 DIAGNOSIS — Z88.1: ICD-10-CM

## 2019-10-15 ENCOUNTER — HOSPITAL ENCOUNTER (EMERGENCY)
Dept: HOSPITAL 83 - ED | Age: 49
LOS: 1 days | Discharge: HOME | End: 2019-10-16
Payer: MEDICARE

## 2019-10-15 VITALS — BODY MASS INDEX: 33.13 KG/M2 | WEIGHT: 250 LBS | HEIGHT: 72.99 IN

## 2019-10-15 VITALS — DIASTOLIC BLOOD PRESSURE: 82 MMHG | SYSTOLIC BLOOD PRESSURE: 148 MMHG

## 2019-10-15 DIAGNOSIS — N18.3: ICD-10-CM

## 2019-10-15 DIAGNOSIS — Z79.899: ICD-10-CM

## 2019-10-15 DIAGNOSIS — Z79.01: ICD-10-CM

## 2019-10-15 DIAGNOSIS — J44.9: ICD-10-CM

## 2019-10-15 DIAGNOSIS — Z88.1: ICD-10-CM

## 2019-10-15 DIAGNOSIS — Z87.891: ICD-10-CM

## 2019-10-15 DIAGNOSIS — Z88.4: ICD-10-CM

## 2019-10-15 DIAGNOSIS — E78.5: ICD-10-CM

## 2019-10-15 DIAGNOSIS — Z98.890: ICD-10-CM

## 2019-10-15 DIAGNOSIS — E66.9: ICD-10-CM

## 2019-10-15 DIAGNOSIS — E11.22: ICD-10-CM

## 2019-10-15 DIAGNOSIS — I48.91: ICD-10-CM
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
## 2019-10-15 DIAGNOSIS — T82.898A: Primary | ICD-10-CM

## 2019-10-15 DIAGNOSIS — G43.909: ICD-10-CM

## 2019-10-15 DIAGNOSIS — Z88.5: ICD-10-CM

## 2019-10-15 DIAGNOSIS — Z88.6: ICD-10-CM

## 2019-10-17 ENCOUNTER — HOSPITAL ENCOUNTER (OUTPATIENT)
Dept: HOSPITAL 83 - PICC | Age: 49
Discharge: HOME | End: 2019-10-17
Attending: INTERNAL MEDICINE
Payer: MEDICARE

## 2019-10-17 VITALS — DIASTOLIC BLOOD PRESSURE: 66 MMHG | SYSTOLIC BLOOD PRESSURE: 117 MMHG

## 2019-10-17 DIAGNOSIS — J44.9: ICD-10-CM

## 2019-10-17 DIAGNOSIS — Z82.49: ICD-10-CM

## 2019-10-17 DIAGNOSIS — K21.9: ICD-10-CM

## 2019-10-17 DIAGNOSIS — F41.9: ICD-10-CM

## 2019-10-17 DIAGNOSIS — F32.9: ICD-10-CM

## 2019-10-17 DIAGNOSIS — Z83.3: ICD-10-CM

## 2019-10-17 DIAGNOSIS — E11.9: ICD-10-CM

## 2019-10-17 DIAGNOSIS — Z45.2: Primary | ICD-10-CM

## 2019-12-11 ENCOUNTER — HOSPITAL ENCOUNTER (OUTPATIENT)
Dept: HOSPITAL 83 - SDC | Age: 49
Discharge: HOME | End: 2019-12-11
Attending: PODIATRIST
Payer: MEDICARE

## 2019-12-11 VITALS — SYSTOLIC BLOOD PRESSURE: 97 MMHG | DIASTOLIC BLOOD PRESSURE: 54 MMHG

## 2019-12-11 VITALS — SYSTOLIC BLOOD PRESSURE: 114 MMHG | DIASTOLIC BLOOD PRESSURE: 66 MMHG

## 2019-12-11 VITALS — WEIGHT: 250 LBS | HEIGHT: 60 IN

## 2019-12-11 VITALS — DIASTOLIC BLOOD PRESSURE: 71 MMHG

## 2019-12-11 VITALS — DIASTOLIC BLOOD PRESSURE: 85 MMHG

## 2019-12-11 DIAGNOSIS — M86.161: ICD-10-CM

## 2019-12-11 DIAGNOSIS — K21.9: ICD-10-CM

## 2019-12-11 DIAGNOSIS — Z82.49: ICD-10-CM

## 2019-12-11 DIAGNOSIS — S91.001A: Primary | ICD-10-CM

## 2019-12-11 DIAGNOSIS — I48.91: ICD-10-CM

## 2019-12-11 DIAGNOSIS — Z95.2: ICD-10-CM

## 2019-12-11 DIAGNOSIS — Z79.899: ICD-10-CM

## 2019-12-11 DIAGNOSIS — F41.9: ICD-10-CM

## 2019-12-11 DIAGNOSIS — I11.0: ICD-10-CM

## 2019-12-11 DIAGNOSIS — F32.9: ICD-10-CM

## 2019-12-11 DIAGNOSIS — E11.9: ICD-10-CM
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
## 2019-12-11 DIAGNOSIS — Z83.3: ICD-10-CM

## 2019-12-11 DIAGNOSIS — J44.9: ICD-10-CM

## 2019-12-11 DIAGNOSIS — I50.9: ICD-10-CM

## 2019-12-11 DIAGNOSIS — Z87.891: ICD-10-CM
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
## 2019-12-12 LAB — SPECIMEN PREPARATION: (no result)

## 2020-01-07 ENCOUNTER — HOSPITAL ENCOUNTER (OUTPATIENT)
Dept: HOSPITAL 83 - RESCLI | Age: 50
Discharge: HOME | End: 2020-01-07
Attending: INTERNAL MEDICINE
Payer: MEDICARE

## 2020-01-07 DIAGNOSIS — R06.02: ICD-10-CM

## 2020-01-07 DIAGNOSIS — Z79.899: ICD-10-CM

## 2020-01-07 DIAGNOSIS — Z79.82: ICD-10-CM

## 2020-01-07 DIAGNOSIS — N18.9: ICD-10-CM

## 2020-01-07 DIAGNOSIS — E78.5: ICD-10-CM

## 2020-01-07 DIAGNOSIS — Z95.2: ICD-10-CM

## 2020-01-07 DIAGNOSIS — Z79.51: ICD-10-CM

## 2020-01-07 DIAGNOSIS — R05: ICD-10-CM

## 2020-01-07 DIAGNOSIS — Z95.0: ICD-10-CM

## 2020-01-07 DIAGNOSIS — I25.10: ICD-10-CM

## 2020-01-07 DIAGNOSIS — Z01.818: Primary | ICD-10-CM

## 2020-01-07 DIAGNOSIS — F41.1: ICD-10-CM

## 2020-01-07 DIAGNOSIS — I48.91: ICD-10-CM

## 2020-01-07 DIAGNOSIS — E11.22: ICD-10-CM

## 2020-01-07 DIAGNOSIS — J44.9: ICD-10-CM

## 2020-01-07 LAB
BASOPHILS # BLD AUTO: 0.1 10*3/UL (ref 0–0.1)
BASOPHILS NFR BLD AUTO: 0.6 % (ref 0–1)
BUN SERPL-MCNC: 11 MG/DL (ref 7–24)
CHLORIDE SERPL-SCNC: 105 MMOL/L (ref 98–107)
CREAT SERPL-MCNC: 1.43 MG/DL (ref 0.7–1.3)
EOSINOPHIL # BLD AUTO: 0.6 10*3/UL (ref 0–0.4)
EOSINOPHIL # BLD AUTO: 5.2 % (ref 1–4)
ERYTHROCYTE [DISTWIDTH] IN BLOOD BY AUTOMATED COUNT: 12.9 % (ref 0–14.5)
HCT VFR BLD AUTO: 44.4 % (ref 42–52)
HGB BLD-MCNC: 14 G/DL (ref 14–18)
LYMPHOCYTES # BLD AUTO: 2.7 10*3/UL (ref 1.3–4.4)
LYMPHOCYTES NFR BLD AUTO: 25.9 % (ref 27–41)
MCH RBC QN AUTO: 27.3 PG (ref 27–31)
MCHC RBC AUTO-ENTMCNC: 31.5 G/DL (ref 33–37)
MCV RBC AUTO: 86.5 FL (ref 80–94)
MONOCYTES # BLD AUTO: 0.8 10*3/UL (ref 0.1–1)
MONOCYTES NFR BLD MANUAL: 8 % (ref 3–9)
NEUT #: 6.3 10*3/UL (ref 2.3–7.9)
NEUT %: 59.9 % (ref 47–73)
NRBC BLD QL AUTO: 0 % (ref 0–0)
PLATELET # BLD AUTO: 289 10*3/UL (ref 130–400)
PMV BLD AUTO: 9.1 FL (ref 9.6–12.3)
POTASSIUM SERPL-SCNC: 3.9 MMOL/L (ref 3.5–5.1)
RBC # BLD AUTO: 5.13 10*6/UL (ref 4.5–5.9)
SODIUM SERPL-SCNC: 139 MMOL/L (ref 136–145)
WBC NRBC COR # BLD AUTO: 10.5 10*3/UL (ref 4.8–10.8)

## 2020-01-08 ENCOUNTER — HOSPITAL ENCOUNTER (OUTPATIENT)
Dept: HOSPITAL 83 - SDC | Age: 50
Discharge: HOME | End: 2020-01-08
Attending: PODIATRIST
Payer: MEDICARE

## 2020-01-08 VITALS — SYSTOLIC BLOOD PRESSURE: 124 MMHG | DIASTOLIC BLOOD PRESSURE: 74 MMHG

## 2020-01-08 VITALS — SYSTOLIC BLOOD PRESSURE: 113 MMHG | DIASTOLIC BLOOD PRESSURE: 69 MMHG

## 2020-01-08 VITALS — WEIGHT: 250 LBS | HEIGHT: 72.99 IN | BODY MASS INDEX: 33.13 KG/M2

## 2020-01-08 VITALS — DIASTOLIC BLOOD PRESSURE: 69 MMHG | SYSTOLIC BLOOD PRESSURE: 131 MMHG

## 2020-01-08 VITALS — DIASTOLIC BLOOD PRESSURE: 66 MMHG | SYSTOLIC BLOOD PRESSURE: 114 MMHG

## 2020-01-08 DIAGNOSIS — Z79.899: ICD-10-CM
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
## 2020-01-08 DIAGNOSIS — I12.9: ICD-10-CM

## 2020-01-08 DIAGNOSIS — J44.9: ICD-10-CM

## 2020-01-08 DIAGNOSIS — Z83.3: ICD-10-CM

## 2020-01-08 DIAGNOSIS — Z82.49: ICD-10-CM

## 2020-01-08 DIAGNOSIS — Z95.2: ICD-10-CM

## 2020-01-08 DIAGNOSIS — N18.2: ICD-10-CM

## 2020-01-08 DIAGNOSIS — Z80.8: ICD-10-CM

## 2020-01-08 DIAGNOSIS — I48.91: ICD-10-CM

## 2020-01-08 DIAGNOSIS — F41.9: ICD-10-CM

## 2020-01-08 DIAGNOSIS — Z98.890: ICD-10-CM

## 2020-01-08 DIAGNOSIS — G43.909: ICD-10-CM

## 2020-01-08 DIAGNOSIS — Z79.82: ICD-10-CM

## 2020-01-08 DIAGNOSIS — Z87.891: ICD-10-CM

## 2020-01-08 DIAGNOSIS — S91.001A: Primary | ICD-10-CM

## 2020-01-08 DIAGNOSIS — Z88.8: ICD-10-CM
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
## 2020-01-08 DIAGNOSIS — E78.5: ICD-10-CM

## 2020-01-08 DIAGNOSIS — F41.1: ICD-10-CM

## 2020-01-08 DIAGNOSIS — K21.9: ICD-10-CM

## 2020-01-08 DIAGNOSIS — I50.9: ICD-10-CM
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
## 2020-01-08 DIAGNOSIS — F32.9: ICD-10-CM

## 2020-01-09 LAB — SPECIMEN PREPARATION: (no result)

## 2020-01-13 ENCOUNTER — HOSPITAL ENCOUNTER (OUTPATIENT)
Dept: HOSPITAL 83 - RESCLI | Age: 50
Discharge: HOME | End: 2020-01-13
Attending: STUDENT IN AN ORGANIZED HEALTH CARE EDUCATION/TRAINING PROGRAM
Payer: MEDICARE

## 2020-01-13 DIAGNOSIS — F41.1: ICD-10-CM

## 2020-01-13 DIAGNOSIS — N18.9: ICD-10-CM

## 2020-01-13 DIAGNOSIS — E11.22: ICD-10-CM

## 2020-01-13 DIAGNOSIS — Z01.818: Primary | ICD-10-CM

## 2020-01-13 DIAGNOSIS — Z95.2: ICD-10-CM

## 2020-01-13 DIAGNOSIS — J44.9: ICD-10-CM

## 2020-01-13 DIAGNOSIS — I48.91: ICD-10-CM

## 2020-01-13 DIAGNOSIS — Z88.6: ICD-10-CM

## 2020-01-13 DIAGNOSIS — Z88.4: ICD-10-CM

## 2020-01-13 DIAGNOSIS — Z79.899: ICD-10-CM

## 2020-01-22 ENCOUNTER — HOSPITAL ENCOUNTER (EMERGENCY)
Dept: HOSPITAL 83 - ED | Age: 50
Discharge: HOME | End: 2020-01-22
Payer: MEDICARE

## 2020-01-22 VITALS — HEIGHT: 72.99 IN | BODY MASS INDEX: 32.74 KG/M2 | WEIGHT: 247 LBS

## 2020-01-22 VITALS — DIASTOLIC BLOOD PRESSURE: 82 MMHG

## 2020-01-22 DIAGNOSIS — F17.200: ICD-10-CM

## 2020-01-22 DIAGNOSIS — M79.604: ICD-10-CM

## 2020-01-22 DIAGNOSIS — Z88.8: ICD-10-CM

## 2020-01-22 DIAGNOSIS — Z79.01: ICD-10-CM

## 2020-01-22 DIAGNOSIS — Z88.1: ICD-10-CM

## 2020-01-22 DIAGNOSIS — J44.9: ICD-10-CM

## 2020-01-22 DIAGNOSIS — M79.661: Primary | ICD-10-CM

## 2020-01-22 DIAGNOSIS — Z88.6: ICD-10-CM

## 2020-01-22 DIAGNOSIS — Z79.899: ICD-10-CM

## 2020-01-22 DIAGNOSIS — K21.9: ICD-10-CM

## 2020-01-22 DIAGNOSIS — E11.9: ICD-10-CM

## 2020-08-04 ENCOUNTER — HOSPITAL ENCOUNTER (INPATIENT)
Dept: HOSPITAL 83 - ED | Age: 50
LOS: 3 days | Discharge: HOME | DRG: 191 | End: 2020-08-07
Attending: INTERNAL MEDICINE | Admitting: INTERNAL MEDICINE
Payer: COMMERCIAL

## 2020-08-04 VITALS — SYSTOLIC BLOOD PRESSURE: 126 MMHG | DIASTOLIC BLOOD PRESSURE: 68 MMHG

## 2020-08-04 VITALS — DIASTOLIC BLOOD PRESSURE: 79 MMHG | SYSTOLIC BLOOD PRESSURE: 117 MMHG

## 2020-08-04 VITALS — DIASTOLIC BLOOD PRESSURE: 90 MMHG

## 2020-08-04 VITALS — SYSTOLIC BLOOD PRESSURE: 98 MMHG | DIASTOLIC BLOOD PRESSURE: 83 MMHG

## 2020-08-04 VITALS — DIASTOLIC BLOOD PRESSURE: 74 MMHG | SYSTOLIC BLOOD PRESSURE: 120 MMHG

## 2020-08-04 VITALS — DIASTOLIC BLOOD PRESSURE: 59 MMHG | SYSTOLIC BLOOD PRESSURE: 101 MMHG

## 2020-08-04 VITALS — DIASTOLIC BLOOD PRESSURE: 89 MMHG

## 2020-08-04 VITALS — DIASTOLIC BLOOD PRESSURE: 70 MMHG | BODY MASS INDEX: 33.66 KG/M2 | WEIGHT: 254 LBS | HEIGHT: 73 IN

## 2020-08-04 VITALS — SYSTOLIC BLOOD PRESSURE: 119 MMHG | DIASTOLIC BLOOD PRESSURE: 77 MMHG

## 2020-08-04 VITALS — DIASTOLIC BLOOD PRESSURE: 80 MMHG

## 2020-08-04 DIAGNOSIS — E11.22: ICD-10-CM

## 2020-08-04 DIAGNOSIS — E78.00: ICD-10-CM

## 2020-08-04 DIAGNOSIS — I48.21: ICD-10-CM

## 2020-08-04 DIAGNOSIS — Z88.8: ICD-10-CM

## 2020-08-04 DIAGNOSIS — Z79.899: ICD-10-CM

## 2020-08-04 DIAGNOSIS — Z88.6: ICD-10-CM

## 2020-08-04 DIAGNOSIS — E11.65: ICD-10-CM

## 2020-08-04 DIAGNOSIS — Z88.1: ICD-10-CM

## 2020-08-04 DIAGNOSIS — S80.811A: ICD-10-CM

## 2020-08-04 DIAGNOSIS — Z95.4: ICD-10-CM

## 2020-08-04 DIAGNOSIS — N18.3: ICD-10-CM

## 2020-08-04 DIAGNOSIS — Z80.8: ICD-10-CM

## 2020-08-04 DIAGNOSIS — F32.9: ICD-10-CM

## 2020-08-04 DIAGNOSIS — T38.0X5A: ICD-10-CM

## 2020-08-04 DIAGNOSIS — Z87.891: ICD-10-CM
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
## 2020-08-04 DIAGNOSIS — E66.9: ICD-10-CM

## 2020-08-04 DIAGNOSIS — Z91.09: ICD-10-CM

## 2020-08-04 DIAGNOSIS — Z79.01: ICD-10-CM

## 2020-08-04 DIAGNOSIS — Z83.3: ICD-10-CM

## 2020-08-04 DIAGNOSIS — Z82.5: ICD-10-CM
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
## 2020-08-04 DIAGNOSIS — Z88.4: ICD-10-CM

## 2020-08-04 DIAGNOSIS — J20.9: ICD-10-CM

## 2020-08-04 DIAGNOSIS — J44.1: Primary | ICD-10-CM
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
## 2020-08-04 DIAGNOSIS — E78.5: ICD-10-CM

## 2020-08-04 DIAGNOSIS — I87.2: ICD-10-CM

## 2020-08-04 DIAGNOSIS — R79.1: ICD-10-CM

## 2020-08-04 DIAGNOSIS — F41.1: ICD-10-CM

## 2020-08-04 DIAGNOSIS — Z82.49: ICD-10-CM

## 2020-08-04 DIAGNOSIS — G43.909: ICD-10-CM
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
## 2020-08-04 DIAGNOSIS — J44.0: ICD-10-CM

## 2020-08-04 DIAGNOSIS — Z88.5: ICD-10-CM

## 2020-08-04 DIAGNOSIS — D72.829: ICD-10-CM

## 2020-08-04 DIAGNOSIS — Z95.0: ICD-10-CM

## 2020-08-04 LAB
ALBUMIN SERPL-MCNC: 3.8 GM/DL (ref 3.1–4.5)
ALP SERPL-CCNC: 135 U/L (ref 45–117)
ALT SERPL W P-5'-P-CCNC: 28 U/L (ref 12–78)
APTT PPP: 31.6 SECONDS (ref 20–32.1)
AST SERPL-CCNC: 22 IU/L (ref 3–35)
BASOPHILS # BLD AUTO: 0.1 10*3/UL (ref 0–0.1)
BASOPHILS NFR BLD AUTO: 0.6 % (ref 0–1)
BUN SERPL-MCNC: 14 MG/DL (ref 7–24)
CHLORIDE SERPL-SCNC: 104 MMOL/L (ref 98–107)
CREAT SERPL-MCNC: 1.4 MG/DL (ref 0.7–1.3)
EOSINOPHIL # BLD AUTO: 0.3 10*3/UL (ref 0–0.4)
EOSINOPHIL # BLD AUTO: 3.7 % (ref 1–4)
ERYTHROCYTE [DISTWIDTH] IN BLOOD BY AUTOMATED COUNT: 12.8 % (ref 0–14.5)
HCT VFR BLD AUTO: 45.2 % (ref 42–52)
INR BLD: 1.4 (ref 2–3.5)
LYMPHOCYTES # BLD AUTO: 2.6 10*3/UL (ref 1.3–4.4)
LYMPHOCYTES NFR BLD AUTO: 30.5 % (ref 27–41)
MCH RBC QN AUTO: 28.8 PG (ref 27–31)
MCHC RBC AUTO-ENTMCNC: 33.2 G/DL (ref 33–37)
MCV RBC AUTO: 86.8 FL (ref 80–94)
MONOCYTES # BLD AUTO: 0.6 10*3/UL (ref 0.1–1)
MONOCYTES NFR BLD MANUAL: 7.2 % (ref 3–9)
NEUT #: 5 10*3/UL (ref 2.3–7.9)
NEUT %: 57.7 % (ref 47–73)
NRBC BLD QL AUTO: 0 % (ref 0–0)
PLATELET # BLD AUTO: 257 10*3/UL (ref 130–400)
PMV BLD AUTO: 9.2 FL (ref 9.6–12.3)
POTASSIUM SERPL-SCNC: 4 MMOL/L (ref 3.5–5.1)
PROT SERPL-MCNC: 7.6 GM/DL (ref 6.4–8.2)
RBC # BLD AUTO: 5.21 10*6/UL (ref 4.5–5.9)
SODIUM SERPL-SCNC: 138 MMOL/L (ref 136–145)
TROPONIN I SERPL-MCNC: < 0.015 NG/ML (ref ?–0.04)
WBC NRBC COR # BLD AUTO: 8.7 10*3/UL (ref 4.8–10.8)

## 2020-08-05 VITALS — DIASTOLIC BLOOD PRESSURE: 72 MMHG | SYSTOLIC BLOOD PRESSURE: 135 MMHG

## 2020-08-05 VITALS — DIASTOLIC BLOOD PRESSURE: 57 MMHG | SYSTOLIC BLOOD PRESSURE: 140 MMHG

## 2020-08-05 VITALS — DIASTOLIC BLOOD PRESSURE: 69 MMHG

## 2020-08-05 VITALS — DIASTOLIC BLOOD PRESSURE: 61 MMHG | SYSTOLIC BLOOD PRESSURE: 124 MMHG

## 2020-08-05 VITALS — DIASTOLIC BLOOD PRESSURE: 57 MMHG

## 2020-08-05 LAB
25(OH)D3 SERPL-MCNC: 37.3 NG/ML (ref 30–100)
ALBUMIN SERPL-MCNC: 3.9 GM/DL (ref 3.1–4.5)
ALP SERPL-CCNC: 119 U/L (ref 45–117)
ALT SERPL W P-5'-P-CCNC: 26 U/L (ref 12–78)
AST SERPL-CCNC: 21 IU/L (ref 3–35)
BASOPHILS # BLD AUTO: 0 10*3/UL (ref 0–0.1)
BASOPHILS NFR BLD AUTO: 0.2 % (ref 0–1)
BUN SERPL-MCNC: 21 MG/DL (ref 7–24)
CHLORIDE SERPL-SCNC: 103 MMOL/L (ref 98–107)
CHOLEST SERPL-MCNC: 219 MG/DL (ref ?–200)
CREAT SERPL-MCNC: 1.73 MG/DL (ref 0.7–1.3)
EOSINOPHIL # BLD AUTO: 0 10*3/UL (ref 0–0.4)
EOSINOPHIL # BLD AUTO: 0.1 % (ref 1–4)
ERYTHROCYTE [DISTWIDTH] IN BLOOD BY AUTOMATED COUNT: 13.5 % (ref 0–14.5)
HCT VFR BLD AUTO: 45.3 % (ref 42–52)
HDLC SERPL-MCNC: 66 MG/DL (ref 40–60)
INR BLD: 2.2 (ref 2–3.5)
LDLC SERPL DIRECT ASSAY-MCNC: 136 MG/DL (ref 9–159)
LYMPHOCYTES # BLD AUTO: 0.7 10*3/UL (ref 1.3–4.4)
LYMPHOCYTES NFR BLD AUTO: 3.4 % (ref 27–41)
MCH RBC QN AUTO: 28.5 PG (ref 27–31)
MCHC RBC AUTO-ENTMCNC: 32.9 G/DL (ref 33–37)
MCV RBC AUTO: 86.8 FL (ref 80–94)
MONOCYTES # BLD AUTO: 0.5 10*3/UL (ref 0.1–1)
MONOCYTES NFR BLD MANUAL: 2.4 % (ref 3–9)
NEUT #: 17.7 10*3/UL (ref 2.3–7.9)
NEUT %: 93.3 % (ref 47–73)
NRBC BLD QL AUTO: 0 10*3/UL (ref 0–0)
PLATELET # BLD AUTO: 266 10*3/UL (ref 130–400)
PLATELET SUFFICIENCY: NORMAL
PMV BLD AUTO: 9.3 FL (ref 9.6–12.3)
POTASSIUM SERPL-SCNC: 3.8 MMOL/L (ref 3.5–5.1)
PROT SERPL-MCNC: 7.8 GM/DL (ref 6.4–8.2)
RBC # BLD AUTO: 5.22 10*6/UL (ref 4.5–5.9)
RBC MORPH BLD: NORMAL
SODIUM SERPL-SCNC: 134 MMOL/L (ref 136–145)
T4 FREE SERPL-MCNC: 0.87 NG/DL (ref 0.76–1.46)
TOTAL CELLS COUNTED: 100 #CELLS
TRIGL SERPL-MCNC: 86 MG/DL (ref ?–150)
TSH SERPL DL<=0.005 MIU/L-ACNC: 0.83 UIU/ML (ref 0.36–4.75)
VITAMIN B12: 452 PG/ML (ref 247–911)
VLDLC SERPL CALC-MCNC: 17 MG/DL (ref 6–40)
WBC NRBC COR # BLD AUTO: 18.9 10*3/UL (ref 4.8–10.8)

## 2020-08-06 VITALS — SYSTOLIC BLOOD PRESSURE: 132 MMHG | DIASTOLIC BLOOD PRESSURE: 72 MMHG

## 2020-08-06 VITALS — SYSTOLIC BLOOD PRESSURE: 123 MMHG | DIASTOLIC BLOOD PRESSURE: 71 MMHG

## 2020-08-06 VITALS — DIASTOLIC BLOOD PRESSURE: 63 MMHG | SYSTOLIC BLOOD PRESSURE: 109 MMHG

## 2020-08-06 VITALS — DIASTOLIC BLOOD PRESSURE: 74 MMHG | SYSTOLIC BLOOD PRESSURE: 136 MMHG

## 2020-08-06 LAB
ALBUMIN SERPL-MCNC: 3.8 GM/DL (ref 3.1–4.5)
ALP SERPL-CCNC: 108 U/L (ref 45–117)
ALT SERPL W P-5'-P-CCNC: 24 U/L (ref 12–78)
APTT PPP: 47.5 SECONDS (ref 20–32.1)
AST SERPL-CCNC: 35 IU/L (ref 3–35)
BUN SERPL-MCNC: 23 MG/DL (ref 7–24)
CHLORIDE SERPL-SCNC: 105 MMOL/L (ref 98–107)
CREAT SERPL-MCNC: 1.35 MG/DL (ref 0.7–1.3)
ERYTHROCYTE [DISTWIDTH] IN BLOOD BY AUTOMATED COUNT: 13.6 % (ref 0–14.5)
HCT VFR BLD AUTO: 45.6 % (ref 42–52)
INR BLD: 2.7 (ref 2–3.5)
MCH RBC QN AUTO: 28.6 PG (ref 27–31)
MCHC RBC AUTO-ENTMCNC: 32.5 G/DL (ref 33–37)
MCV RBC AUTO: 88.2 FL (ref 80–94)
NRBC BLD QL AUTO: 0 10*3/UL (ref 0–0)
PLATELET # BLD AUTO: 256 10*3/UL (ref 130–400)
PLATELET SUFFICIENCY: NORMAL
PMV BLD AUTO: 9.3 FL (ref 9.6–12.3)
POTASSIUM SERPL-SCNC: 4.3 MMOL/L (ref 3.5–5.1)
PROT SERPL-MCNC: 7.4 GM/DL (ref 6.4–8.2)
RBC # BLD AUTO: 5.17 10*6/UL (ref 4.5–5.9)
RBC MORPH BLD: NORMAL
SODIUM SERPL-SCNC: 135 MMOL/L (ref 136–145)
TOTAL CELLS COUNTED: 100 #CELLS
WBC NRBC COR # BLD AUTO: 19.1 10*3/UL (ref 4.8–10.8)

## 2020-08-07 VITALS — DIASTOLIC BLOOD PRESSURE: 81 MMHG | SYSTOLIC BLOOD PRESSURE: 122 MMHG

## 2020-08-07 VITALS — SYSTOLIC BLOOD PRESSURE: 140 MMHG | DIASTOLIC BLOOD PRESSURE: 65 MMHG

## 2020-08-07 LAB
BASOPHILS # BLD AUTO: 0 10*3/UL (ref 0–0.1)
BASOPHILS NFR BLD AUTO: 0.1 % (ref 0–1)
BUN SERPL-MCNC: 25 MG/DL (ref 7–24)
CHLORIDE SERPL-SCNC: 101 MMOL/L (ref 98–107)
CREAT SERPL-MCNC: 1.5 MG/DL (ref 0.7–1.3)
EOSINOPHIL # BLD AUTO: 0.1 10*3/UL (ref 0–0.4)
EOSINOPHIL # BLD AUTO: 0.6 % (ref 1–4)
ERYTHROCYTE [DISTWIDTH] IN BLOOD BY AUTOMATED COUNT: 13.8 % (ref 0–14.5)
HCT VFR BLD AUTO: 46.8 % (ref 42–52)
INR BLD: 3.3 (ref 2–3.5)
LYMPHOCYTES # BLD AUTO: 2.3 10*3/UL (ref 1.3–4.4)
LYMPHOCYTES NFR BLD AUTO: 16.7 % (ref 27–41)
MCH RBC QN AUTO: 28.5 PG (ref 27–31)
MCHC RBC AUTO-ENTMCNC: 32.3 G/DL (ref 33–37)
MCV RBC AUTO: 88.3 FL (ref 80–94)
MONOCYTES # BLD AUTO: 1.1 10*3/UL (ref 0.1–1)
MONOCYTES NFR BLD MANUAL: 7.8 % (ref 3–9)
NEUT #: 10.3 10*3/UL (ref 2.3–7.9)
NEUT %: 73.9 % (ref 47–73)
NRBC BLD QL AUTO: 0 10*3/UL (ref 0–0)
PLATELET # BLD AUTO: 231 10*3/UL (ref 130–400)
PMV BLD AUTO: 9.5 FL (ref 9.6–12.3)
POTASSIUM SERPL-SCNC: 4.4 MMOL/L (ref 3.5–5.1)
RBC # BLD AUTO: 5.3 10*6/UL (ref 4.5–5.9)
SODIUM SERPL-SCNC: 137 MMOL/L (ref 136–145)
WBC NRBC COR # BLD AUTO: 14 10*3/UL (ref 4.8–10.8)

## 2020-08-28 ENCOUNTER — HOSPITAL ENCOUNTER (OUTPATIENT)
Dept: HOSPITAL 83 - COVID19 | Age: 50
Discharge: HOME | End: 2020-08-28
Attending: PODIATRIST
Payer: COMMERCIAL

## 2020-08-28 VITALS — DIASTOLIC BLOOD PRESSURE: 103 MMHG | SYSTOLIC BLOOD PRESSURE: 136 MMHG

## 2020-08-28 DIAGNOSIS — Z95.1: ICD-10-CM

## 2020-08-28 DIAGNOSIS — Z95.0: ICD-10-CM

## 2020-08-28 DIAGNOSIS — Z20.828: ICD-10-CM

## 2020-08-28 DIAGNOSIS — Z01.812: Primary | ICD-10-CM

## 2020-08-28 LAB
BASOPHILS # BLD AUTO: 0 10*3/UL (ref 0–0.1)
BASOPHILS NFR BLD AUTO: 0.1 % (ref 0–1)
CHLORIDE SERPL-SCNC: 105 MMOL/L (ref 98–107)
EOSINOPHIL # BLD AUTO: 0.2 10*3/UL (ref 0–0.4)
EOSINOPHIL # BLD AUTO: 2.2 % (ref 1–4)
ERYTHROCYTE [DISTWIDTH] IN BLOOD BY AUTOMATED COUNT: 13.3 % (ref 0–14.5)
HCT VFR BLD AUTO: 46.1 % (ref 42–52)
LYMPHOCYTES # BLD AUTO: 2.1 10*3/UL (ref 1.3–4.4)
LYMPHOCYTES NFR BLD AUTO: 22.1 % (ref 27–41)
MCH RBC QN AUTO: 28.6 PG (ref 27–31)
MCHC RBC AUTO-ENTMCNC: 32.5 G/DL (ref 33–37)
MCV RBC AUTO: 88 FL (ref 80–94)
MONOCYTES # BLD AUTO: 0.6 10*3/UL (ref 0.1–1)
MONOCYTES NFR BLD MANUAL: 6.7 % (ref 3–9)
NEUT #: 6.6 10*3/UL (ref 2.3–7.9)
NEUT %: 68.6 % (ref 47–73)
NRBC BLD QL AUTO: 0 10*3/UL (ref 0–0)
PLATELET # BLD AUTO: 240 10*3/UL (ref 130–400)
PMV BLD AUTO: 9 FL (ref 9.6–12.3)
POTASSIUM SERPL-SCNC: 4.7 MMOL/L (ref 3.5–5.1)
RBC # BLD AUTO: 5.24 10*6/UL (ref 4.5–5.9)
SODIUM SERPL-SCNC: 141 MMOL/L (ref 136–145)
WBC NRBC COR # BLD AUTO: 9.6 10*3/UL (ref 4.8–10.8)

## 2020-09-02 ENCOUNTER — HOSPITAL ENCOUNTER (OUTPATIENT)
Dept: HOSPITAL 83 - SDC | Age: 50
Discharge: HOME | End: 2020-09-02
Attending: PODIATRIST
Payer: COMMERCIAL

## 2020-09-02 VITALS — SYSTOLIC BLOOD PRESSURE: 105 MMHG | DIASTOLIC BLOOD PRESSURE: 66 MMHG

## 2020-09-02 VITALS — WEIGHT: 250 LBS | HEIGHT: 72.99 IN | BODY MASS INDEX: 33.13 KG/M2

## 2020-09-02 VITALS — DIASTOLIC BLOOD PRESSURE: 77 MMHG | SYSTOLIC BLOOD PRESSURE: 117 MMHG

## 2020-09-02 VITALS — DIASTOLIC BLOOD PRESSURE: 55 MMHG | SYSTOLIC BLOOD PRESSURE: 116 MMHG

## 2020-09-02 VITALS — DIASTOLIC BLOOD PRESSURE: 82 MMHG

## 2020-09-02 VITALS — DIASTOLIC BLOOD PRESSURE: 81 MMHG

## 2020-09-02 DIAGNOSIS — F32.9: ICD-10-CM

## 2020-09-02 DIAGNOSIS — Z88.8: ICD-10-CM

## 2020-09-02 DIAGNOSIS — Z98.890: ICD-10-CM

## 2020-09-02 DIAGNOSIS — Z87.891: ICD-10-CM

## 2020-09-02 DIAGNOSIS — Z82.49: ICD-10-CM

## 2020-09-02 DIAGNOSIS — L97.312: Primary | ICD-10-CM

## 2020-09-02 DIAGNOSIS — Z83.3: ICD-10-CM

## 2020-09-02 DIAGNOSIS — I11.0: ICD-10-CM

## 2020-09-02 DIAGNOSIS — Z79.899: ICD-10-CM

## 2020-09-02 DIAGNOSIS — I50.9: ICD-10-CM

## 2020-09-02 DIAGNOSIS — M86.161: ICD-10-CM

## 2020-09-02 DIAGNOSIS — E11.9: ICD-10-CM

## 2020-09-02 DIAGNOSIS — E78.5: ICD-10-CM

## 2020-09-02 DIAGNOSIS — I48.91: ICD-10-CM

## 2020-09-02 DIAGNOSIS — Z95.0: ICD-10-CM

## 2020-09-02 DIAGNOSIS — Z95.2: ICD-10-CM

## 2020-09-02 DIAGNOSIS — Z79.82: ICD-10-CM

## 2020-09-02 DIAGNOSIS — J44.9: ICD-10-CM

## 2020-09-02 DIAGNOSIS — F41.9: ICD-10-CM

## 2020-09-03 LAB — SPECIMEN PREPARATION: (no result)

## 2020-10-09 ENCOUNTER — HOSPITAL ENCOUNTER (OUTPATIENT)
Dept: HOSPITAL 83 - COVID19 | Age: 50
Discharge: HOME | End: 2020-10-09
Attending: PODIATRIST
Payer: COMMERCIAL

## 2020-10-09 DIAGNOSIS — Z01.812: Primary | ICD-10-CM

## 2020-10-09 DIAGNOSIS — Z20.828: ICD-10-CM

## 2020-10-14 ENCOUNTER — HOSPITAL ENCOUNTER (OUTPATIENT)
Dept: HOSPITAL 83 - SDC | Age: 50
Discharge: HOME | End: 2020-10-14
Attending: PODIATRIST
Payer: COMMERCIAL

## 2020-10-14 VITALS — DIASTOLIC BLOOD PRESSURE: 64 MMHG | SYSTOLIC BLOOD PRESSURE: 101 MMHG

## 2020-10-14 VITALS — BODY MASS INDEX: 32.47 KG/M2 | HEIGHT: 72.99 IN | WEIGHT: 245 LBS

## 2020-10-14 VITALS — DIASTOLIC BLOOD PRESSURE: 60 MMHG | SYSTOLIC BLOOD PRESSURE: 103 MMHG

## 2020-10-14 VITALS — DIASTOLIC BLOOD PRESSURE: 78 MMHG

## 2020-10-14 VITALS — DIASTOLIC BLOOD PRESSURE: 65 MMHG
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
## 2020-10-14 DIAGNOSIS — F32.9: ICD-10-CM

## 2020-10-14 DIAGNOSIS — F41.9: ICD-10-CM
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
## 2020-10-14 DIAGNOSIS — E11.9: ICD-10-CM

## 2020-10-14 DIAGNOSIS — Z88.8: ICD-10-CM

## 2020-10-14 DIAGNOSIS — S91.001A: Primary | ICD-10-CM

## 2020-10-14 DIAGNOSIS — L97.312: ICD-10-CM

## 2020-10-14 DIAGNOSIS — J44.9: ICD-10-CM
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
## 2020-10-14 DIAGNOSIS — Z98.890: ICD-10-CM

## 2020-10-14 DIAGNOSIS — Z79.899: ICD-10-CM

## 2020-10-14 DIAGNOSIS — Z83.3: ICD-10-CM

## 2020-10-14 DIAGNOSIS — Z95.5: ICD-10-CM

## 2020-10-14 DIAGNOSIS — Z87.891: ICD-10-CM

## 2020-10-14 DIAGNOSIS — K21.9: ICD-10-CM

## 2020-10-14 DIAGNOSIS — I48.91: ICD-10-CM

## 2020-10-14 DIAGNOSIS — Z82.49: ICD-10-CM

## 2020-10-14 DIAGNOSIS — E78.5: ICD-10-CM

## 2020-10-15 LAB — SPECIMEN PREPARATION: (no result)

## 2021-02-05 ENCOUNTER — HOSPITAL ENCOUNTER (EMERGENCY)
Dept: HOSPITAL 83 - ED | Age: 51
Discharge: HOME | End: 2021-02-05
Payer: COMMERCIAL

## 2021-02-05 VITALS — BODY MASS INDEX: 31.41 KG/M2 | HEIGHT: 72.99 IN | WEIGHT: 237 LBS

## 2021-02-05 VITALS — DIASTOLIC BLOOD PRESSURE: 87 MMHG

## 2021-02-05 DIAGNOSIS — Z98.890: ICD-10-CM

## 2021-02-05 DIAGNOSIS — Z88.8: ICD-10-CM

## 2021-02-05 DIAGNOSIS — N18.30: ICD-10-CM

## 2021-02-05 DIAGNOSIS — E11.22: ICD-10-CM

## 2021-02-05 DIAGNOSIS — Z91.048: ICD-10-CM

## 2021-02-05 DIAGNOSIS — J44.9: ICD-10-CM
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
## 2021-02-05 DIAGNOSIS — Z79.01: ICD-10-CM

## 2021-02-05 DIAGNOSIS — I48.91: ICD-10-CM

## 2021-02-05 DIAGNOSIS — S00.01XA: Primary | ICD-10-CM

## 2021-02-05 DIAGNOSIS — E78.5: ICD-10-CM

## 2021-02-05 DIAGNOSIS — Z79.899: ICD-10-CM

## 2021-02-05 DIAGNOSIS — F32.9: ICD-10-CM

## 2021-02-05 DIAGNOSIS — E66.9: ICD-10-CM

## 2021-02-05 DIAGNOSIS — G43.909: ICD-10-CM

## 2021-02-05 DIAGNOSIS — Z88.5: ICD-10-CM

## 2021-02-05 DIAGNOSIS — Z88.4: ICD-10-CM

## 2021-02-05 LAB
APTT PPP: 28.3 SECONDS (ref 20–32.1)
BASOPHILS # BLD AUTO: 0.1 10*3/UL (ref 0–0.1)
BASOPHILS NFR BLD AUTO: 0.5 % (ref 0–1)
BUN SERPL-MCNC: 11 MG/DL (ref 7–24)
CHLORIDE SERPL-SCNC: 105 MMOL/L (ref 98–107)
CREAT SERPL-MCNC: 1.2 MG/DL (ref 0.7–1.3)
EOSINOPHIL # BLD AUTO: 0.3 10*3/UL (ref 0–0.4)
EOSINOPHIL # BLD AUTO: 2.9 % (ref 1–4)
ERYTHROCYTE [DISTWIDTH] IN BLOOD BY AUTOMATED COUNT: 13.2 % (ref 0–14.5)
HCT VFR BLD AUTO: 45.4 % (ref 42–52)
INR BLD: 1.1 (ref 2–3.5)
LYMPHOCYTES # BLD AUTO: 2.5 10*3/UL (ref 1.3–4.4)
LYMPHOCYTES NFR BLD AUTO: 26.1 % (ref 27–41)
MCH RBC QN AUTO: 27.8 PG (ref 27–31)
MCHC RBC AUTO-ENTMCNC: 32.2 G/DL (ref 33–37)
MCV RBC AUTO: 86.5 FL (ref 80–94)
MONOCYTES # BLD AUTO: 0.7 10*3/UL (ref 0.1–1)
MONOCYTES NFR BLD MANUAL: 7.8 % (ref 3–9)
NEUT #: 5.8 10*3/UL (ref 2.3–7.9)
NEUT %: 61.9 % (ref 47–73)
NRBC BLD QL AUTO: 0 % (ref 0–0)
PLATELET # BLD AUTO: 256 10*3/UL (ref 130–400)
PMV BLD AUTO: 8.8 FL (ref 9.6–12.3)
POTASSIUM SERPL-SCNC: 3.5 MMOL/L (ref 3.5–5.1)
RBC # BLD AUTO: 5.25 10*6/UL (ref 4.5–5.9)
SODIUM SERPL-SCNC: 138 MMOL/L (ref 136–145)
WBC NRBC COR # BLD AUTO: 9.4 10*3/UL (ref 4.8–10.8)

## 2021-08-06 ENCOUNTER — HOSPITAL ENCOUNTER (EMERGENCY)
Dept: HOSPITAL 83 - ED | Age: 51
Discharge: HOME | End: 2021-08-06
Payer: COMMERCIAL

## 2021-08-06 VITALS — SYSTOLIC BLOOD PRESSURE: 140 MMHG | DIASTOLIC BLOOD PRESSURE: 79 MMHG

## 2021-08-06 VITALS — HEIGHT: 60.98 IN | BODY MASS INDEX: 44.18 KG/M2 | WEIGHT: 234 LBS
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
## 2021-08-06 DIAGNOSIS — S61.211A: Primary | ICD-10-CM

## 2021-08-06 DIAGNOSIS — Z88.5: ICD-10-CM

## 2021-08-06 DIAGNOSIS — Z88.4: ICD-10-CM

## 2021-08-06 DIAGNOSIS — Z79.01: ICD-10-CM

## 2021-08-06 DIAGNOSIS — Z87.891: ICD-10-CM

## 2021-08-06 DIAGNOSIS — Z79.899: ICD-10-CM
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
## 2021-08-06 DIAGNOSIS — Z91.048: ICD-10-CM

## 2021-08-06 DIAGNOSIS — Z79.82: ICD-10-CM

## 2021-08-06 DIAGNOSIS — Z88.1: ICD-10-CM

## 2021-08-06 DIAGNOSIS — Z88.6: ICD-10-CM
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
## 2021-08-06 DIAGNOSIS — Z79.2: ICD-10-CM

## 2021-08-06 DIAGNOSIS — Z95.0: ICD-10-CM

## 2021-09-08 ENCOUNTER — HOSPITAL ENCOUNTER (EMERGENCY)
Dept: HOSPITAL 83 - ED | Age: 51
LOS: 1 days | Discharge: HOME | End: 2021-09-09
Payer: COMMERCIAL

## 2021-09-08 VITALS — HEIGHT: 72.99 IN | BODY MASS INDEX: 30.75 KG/M2 | WEIGHT: 232 LBS

## 2021-09-08 VITALS — DIASTOLIC BLOOD PRESSURE: 79 MMHG | SYSTOLIC BLOOD PRESSURE: 118 MMHG
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
## 2021-09-08 DIAGNOSIS — Z79.899: ICD-10-CM

## 2021-09-08 DIAGNOSIS — G43.909: ICD-10-CM

## 2021-09-08 DIAGNOSIS — Z87.891: ICD-10-CM

## 2021-09-08 DIAGNOSIS — S01.83XA: ICD-10-CM

## 2021-09-08 DIAGNOSIS — S01.531A: Primary | ICD-10-CM

## 2021-09-08 DIAGNOSIS — Z88.5: ICD-10-CM

## 2021-09-08 DIAGNOSIS — Z79.01: ICD-10-CM
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
## 2021-09-08 DIAGNOSIS — Z88.1: ICD-10-CM

## 2021-09-08 DIAGNOSIS — J44.9: ICD-10-CM
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
## 2021-09-08 DIAGNOSIS — Z88.6: ICD-10-CM

## 2021-12-09 ENCOUNTER — HOSPITAL ENCOUNTER (OUTPATIENT)
Dept: HOSPITAL 83 - CARD | Age: 51
Discharge: HOME | End: 2021-12-09
Attending: INTERNAL MEDICINE
Payer: COMMERCIAL

## 2021-12-09 DIAGNOSIS — Z95.2: ICD-10-CM

## 2021-12-09 DIAGNOSIS — R06.02: ICD-10-CM

## 2021-12-09 DIAGNOSIS — Z95.0: ICD-10-CM

## 2021-12-09 DIAGNOSIS — I49.5: ICD-10-CM

## 2021-12-09 DIAGNOSIS — R07.89: Primary | ICD-10-CM

## 2022-09-25 ENCOUNTER — HOSPITAL ENCOUNTER (INPATIENT)
Dept: HOSPITAL 83 - ED | Age: 52
LOS: 5 days | Discharge: ANOTHER HOSPITAL | DRG: 576 | End: 2022-09-30
Attending: STUDENT IN AN ORGANIZED HEALTH CARE EDUCATION/TRAINING PROGRAM | Admitting: STUDENT IN AN ORGANIZED HEALTH CARE EDUCATION/TRAINING PROGRAM
Payer: COMMERCIAL

## 2022-09-25 VITALS — WEIGHT: 247 LBS | HEIGHT: 72.99 IN | BODY MASS INDEX: 32.74 KG/M2

## 2022-09-25 VITALS — DIASTOLIC BLOOD PRESSURE: 75 MMHG

## 2022-09-25 VITALS — SYSTOLIC BLOOD PRESSURE: 118 MMHG | DIASTOLIC BLOOD PRESSURE: 83 MMHG

## 2022-09-25 VITALS — DIASTOLIC BLOOD PRESSURE: 90 MMHG

## 2022-09-25 DIAGNOSIS — Z88.1: ICD-10-CM

## 2022-09-25 DIAGNOSIS — S81.801A: Primary | ICD-10-CM

## 2022-09-25 DIAGNOSIS — L02.415: ICD-10-CM

## 2022-09-25 DIAGNOSIS — I48.91: ICD-10-CM

## 2022-09-25 DIAGNOSIS — N18.31: ICD-10-CM

## 2022-09-25 DIAGNOSIS — G43.909: ICD-10-CM

## 2022-09-25 DIAGNOSIS — Z88.8: ICD-10-CM

## 2022-09-25 DIAGNOSIS — I21.4: ICD-10-CM

## 2022-09-25 DIAGNOSIS — E11.22: ICD-10-CM

## 2022-09-25 DIAGNOSIS — E11.65: ICD-10-CM

## 2022-09-25 DIAGNOSIS — Z95.2: ICD-10-CM
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
## 2022-09-25 DIAGNOSIS — D72.829: ICD-10-CM
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
## 2022-09-25 DIAGNOSIS — Z83.3: ICD-10-CM

## 2022-09-25 DIAGNOSIS — Z82.49: ICD-10-CM

## 2022-09-25 DIAGNOSIS — Z88.6: ICD-10-CM
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
## 2022-09-25 DIAGNOSIS — J44.9: ICD-10-CM
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
## 2022-09-25 DIAGNOSIS — F41.1: ICD-10-CM

## 2022-09-25 DIAGNOSIS — K21.9: ICD-10-CM

## 2022-09-25 DIAGNOSIS — E66.9: ICD-10-CM

## 2022-09-25 LAB
ALP SERPL-CCNC: 147 U/L (ref 45–117)
ALT SERPL W P-5'-P-CCNC: 28 U/L (ref 12–78)
AST SERPL-CCNC: 20 IU/L (ref 3–35)
BASOPHILS # BLD AUTO: 0.1 10*3/UL (ref 0–0.1)
BASOPHILS NFR BLD AUTO: 0.4 % (ref 0–1)
BUN SERPL-MCNC: 14 MG/DL (ref 7–24)
CHLORIDE SERPL-SCNC: 107 MMOL/L (ref 98–107)
CREAT SERPL-MCNC: 1.22 MG/DL (ref 0.7–1.3)
EOSINOPHIL # BLD AUTO: 0.4 10*3/UL (ref 0–0.4)
EOSINOPHIL # BLD AUTO: 3.8 % (ref 1–4)
ERYTHROCYTE [DISTWIDTH] IN BLOOD BY AUTOMATED COUNT: 13 % (ref 0–14.5)
HCT VFR BLD AUTO: 42.6 % (ref 42–52)
LYMPHOCYTES # BLD AUTO: 2.2 10*3/UL (ref 1.3–4.4)
LYMPHOCYTES NFR BLD AUTO: 19.2 % (ref 27–41)
MCH RBC QN AUTO: 29.1 PG (ref 27–31)
MCHC RBC AUTO-ENTMCNC: 34 G/DL (ref 33–37)
MCV RBC AUTO: 85.5 FL (ref 80–94)
MONOCYTES # BLD AUTO: 0.9 10*3/UL (ref 0.1–1)
MONOCYTES NFR BLD MANUAL: 8.2 % (ref 3–9)
NEUT #: 7.7 10*3/UL (ref 2.3–7.9)
NEUT %: 68.1 % (ref 47–73)
NRBC BLD QL AUTO: 0 % (ref 0–0)
PLATELET # BLD AUTO: 257 10*3/UL (ref 130–400)
PMV BLD AUTO: 8.9 FL (ref 9.6–12.3)
POTASSIUM SERPL-SCNC: 3.7 MMOL/L (ref 3.5–5.1)
PROT SERPL-MCNC: 7.3 GM/DL (ref 6.4–8.2)
RBC # BLD AUTO: 4.98 10*6/UL (ref 4.5–5.9)
SODIUM SERPL-SCNC: 139 MMOL/L (ref 136–145)
WBC NRBC COR # BLD AUTO: 11.3 10*3/UL (ref 4.8–10.8)

## 2022-09-26 VITALS — DIASTOLIC BLOOD PRESSURE: 76 MMHG | SYSTOLIC BLOOD PRESSURE: 109 MMHG

## 2022-09-26 VITALS — DIASTOLIC BLOOD PRESSURE: 74 MMHG

## 2022-09-26 VITALS — SYSTOLIC BLOOD PRESSURE: 127 MMHG | DIASTOLIC BLOOD PRESSURE: 88 MMHG

## 2022-09-26 VITALS — SYSTOLIC BLOOD PRESSURE: 125 MMHG | DIASTOLIC BLOOD PRESSURE: 72 MMHG

## 2022-09-26 VITALS — DIASTOLIC BLOOD PRESSURE: 44 MMHG

## 2022-09-26 VITALS — DIASTOLIC BLOOD PRESSURE: 71 MMHG | SYSTOLIC BLOOD PRESSURE: 110 MMHG

## 2022-09-26 VITALS — DIASTOLIC BLOOD PRESSURE: 70 MMHG | SYSTOLIC BLOOD PRESSURE: 102 MMHG

## 2022-09-26 VITALS — DIASTOLIC BLOOD PRESSURE: 81 MMHG

## 2022-09-26 LAB
ALP SERPL-CCNC: 130 U/L (ref 45–117)
ALT SERPL W P-5'-P-CCNC: 28 U/L (ref 12–78)
APTT PPP: 27.1 SECONDS (ref 20–32.1)
AST SERPL-CCNC: 21 IU/L (ref 3–35)
BASOPHILS # BLD AUTO: 0 10*3/UL (ref 0–0.1)
BASOPHILS NFR BLD AUTO: 0.5 % (ref 0–1)
BUN SERPL-MCNC: 16 MG/DL (ref 7–24)
CHLORIDE SERPL-SCNC: 106 MMOL/L (ref 98–107)
CHOLEST SERPL-MCNC: 143 MG/DL (ref ?–200)
CREAT SERPL-MCNC: 1.31 MG/DL (ref 0.7–1.3)
EOSINOPHIL # BLD AUTO: 0.4 10*3/UL (ref 0–0.4)
EOSINOPHIL # BLD AUTO: 4.3 % (ref 1–4)
ERYTHROCYTE [DISTWIDTH] IN BLOOD BY AUTOMATED COUNT: 12.9 % (ref 0–14.5)
HCT VFR BLD AUTO: 42.1 % (ref 42–52)
INR BLD: 0.9 (ref 2–3.5)
LDLC SERPL DIRECT ASSAY-MCNC: 71 MG/DL (ref 9–159)
LYMPHOCYTES # BLD AUTO: 2.6 10*3/UL (ref 1.3–4.4)
LYMPHOCYTES NFR BLD AUTO: 29.6 % (ref 27–41)
MCH RBC QN AUTO: 28.9 PG (ref 27–31)
MCHC RBC AUTO-ENTMCNC: 33.3 G/DL (ref 33–37)
MCV RBC AUTO: 87 FL (ref 80–94)
MONOCYTES # BLD AUTO: 0.7 10*3/UL (ref 0.1–1)
MONOCYTES NFR BLD MANUAL: 8.4 % (ref 3–9)
NEUT #: 5.1 10*3/UL (ref 2.3–7.9)
NEUT %: 56.9 % (ref 47–73)
NRBC BLD QL AUTO: 0 % (ref 0–0)
PLATELET # BLD AUTO: 230 10*3/UL (ref 130–400)
PMV BLD AUTO: 9.2 FL (ref 9.6–12.3)
POTASSIUM SERPL-SCNC: 4 MMOL/L (ref 3.5–5.1)
PROT SERPL-MCNC: 7 GM/DL (ref 6.4–8.2)
RBC # BLD AUTO: 4.84 10*6/UL (ref 4.5–5.9)
SODIUM SERPL-SCNC: 140 MMOL/L (ref 136–145)
T4 FREE SERPL-MCNC: 0.96 NG/DL (ref 0.76–1.46)
TRIGL SERPL-MCNC: 169 MG/DL (ref ?–150)
TSH SERPL DL<=0.005 MIU/L-ACNC: 4.26 UIU/ML (ref 0.36–4.75)
WBC NRBC COR # BLD AUTO: 8.9 10*3/UL (ref 4.8–10.8)

## 2022-09-26 PROCEDURE — 0LBN0ZZ EXCISION OF RIGHT LOWER LEG TENDON, OPEN APPROACH: ICD-10-PCS | Performed by: PODIATRIST

## 2022-09-26 PROCEDURE — 0HRKXK3 REPLACEMENT OF RIGHT LOWER LEG SKIN WITH NONAUTOLOGOUS TISSUE SUBSTITUTE, FULL THICKNESS, EXTERNAL APPROACH: ICD-10-PCS | Performed by: PODIATRIST

## 2022-09-27 VITALS — DIASTOLIC BLOOD PRESSURE: 67 MMHG

## 2022-09-27 VITALS — DIASTOLIC BLOOD PRESSURE: 69 MMHG

## 2022-09-27 VITALS — SYSTOLIC BLOOD PRESSURE: 103 MMHG | DIASTOLIC BLOOD PRESSURE: 48 MMHG

## 2022-09-27 VITALS — DIASTOLIC BLOOD PRESSURE: 71 MMHG

## 2022-09-27 VITALS — DIASTOLIC BLOOD PRESSURE: 80 MMHG

## 2022-09-27 LAB
BASOPHILS # BLD AUTO: 0 10*3/UL (ref 0–0.1)
BASOPHILS NFR BLD AUTO: 0.5 % (ref 0–1)
BUN SERPL-MCNC: 18 MG/DL (ref 7–24)
CHLORIDE SERPL-SCNC: 111 MMOL/L (ref 98–107)
CREAT SERPL-MCNC: 1.29 MG/DL (ref 0.7–1.3)
EOSINOPHIL # BLD AUTO: 0.4 10*3/UL (ref 0–0.4)
EOSINOPHIL # BLD AUTO: 4.6 % (ref 1–4)
ERYTHROCYTE [DISTWIDTH] IN BLOOD BY AUTOMATED COUNT: 12.9 % (ref 0–14.5)
HCT VFR BLD AUTO: 38.5 % (ref 42–52)
LYMPHOCYTES # BLD AUTO: 2.1 10*3/UL (ref 1.3–4.4)
LYMPHOCYTES NFR BLD AUTO: 25.8 % (ref 27–41)
MCH RBC QN AUTO: 28.9 PG (ref 27–31)
MCHC RBC AUTO-ENTMCNC: 32.5 G/DL (ref 33–37)
MCV RBC AUTO: 88.9 FL (ref 80–94)
MONOCYTES # BLD AUTO: 0.6 10*3/UL (ref 0.1–1)
MONOCYTES NFR BLD MANUAL: 7.6 % (ref 3–9)
NEUT #: 5 10*3/UL (ref 2.3–7.9)
NEUT %: 61.1 % (ref 47–73)
NRBC BLD QL AUTO: 0 10*3/UL (ref 0–0)
PLATELET # BLD AUTO: 222 10*3/UL (ref 130–400)
PMV BLD AUTO: 9.2 FL (ref 9.6–12.3)
POTASSIUM SERPL-SCNC: 4.6 MMOL/L (ref 3.5–5.1)
RBC # BLD AUTO: 4.33 10*6/UL (ref 4.5–5.9)
SODIUM SERPL-SCNC: 145 MMOL/L (ref 136–145)
WBC NRBC COR # BLD AUTO: 8.2 10*3/UL (ref 4.8–10.8)

## 2022-09-28 VITALS — DIASTOLIC BLOOD PRESSURE: 63 MMHG

## 2022-09-28 VITALS — DIASTOLIC BLOOD PRESSURE: 65 MMHG | SYSTOLIC BLOOD PRESSURE: 115 MMHG

## 2022-09-28 VITALS — DIASTOLIC BLOOD PRESSURE: 71 MMHG

## 2022-09-28 VITALS — DIASTOLIC BLOOD PRESSURE: 80 MMHG

## 2022-09-28 VITALS — DIASTOLIC BLOOD PRESSURE: 77 MMHG

## 2022-09-28 LAB
BASOPHILS # BLD AUTO: 0 10*3/UL (ref 0–0.1)
BASOPHILS NFR BLD AUTO: 0.5 % (ref 0–1)
BUN SERPL-MCNC: 13 MG/DL (ref 7–24)
CHLORIDE SERPL-SCNC: 111 MMOL/L (ref 98–107)
CREAT SERPL-MCNC: 1.35 MG/DL (ref 0.7–1.3)
EOSINOPHIL # BLD AUTO: 0.5 10*3/UL (ref 0–0.4)
EOSINOPHIL # BLD AUTO: 5.9 % (ref 1–4)
ERYTHROCYTE [DISTWIDTH] IN BLOOD BY AUTOMATED COUNT: 13.1 % (ref 0–14.5)
HCT VFR BLD AUTO: 38.6 % (ref 42–52)
LYMPHOCYTES # BLD AUTO: 2.2 10*3/UL (ref 1.3–4.4)
LYMPHOCYTES NFR BLD AUTO: 25.1 % (ref 27–41)
MCH RBC QN AUTO: 29.2 PG (ref 27–31)
MCHC RBC AUTO-ENTMCNC: 33.4 G/DL (ref 33–37)
MCV RBC AUTO: 87.3 FL (ref 80–94)
MONOCYTES # BLD AUTO: 0.6 10*3/UL (ref 0.1–1)
MONOCYTES NFR BLD MANUAL: 6.9 % (ref 3–9)
NEUT #: 5.3 10*3/UL (ref 2.3–7.9)
NEUT %: 61.3 % (ref 47–73)
NRBC BLD QL AUTO: 0 % (ref 0–0)
PLATELET # BLD AUTO: 211 10*3/UL (ref 130–400)
PMV BLD AUTO: 8.9 FL (ref 9.6–12.3)
POTASSIUM SERPL-SCNC: 4.2 MMOL/L (ref 3.5–5.1)
RBC # BLD AUTO: 4.42 10*6/UL (ref 4.5–5.9)
SODIUM SERPL-SCNC: 143 MMOL/L (ref 136–145)
WBC NRBC COR # BLD AUTO: 8.6 10*3/UL (ref 4.8–10.8)

## 2022-09-29 VITALS — DIASTOLIC BLOOD PRESSURE: 70 MMHG | SYSTOLIC BLOOD PRESSURE: 117 MMHG

## 2022-09-29 VITALS — DIASTOLIC BLOOD PRESSURE: 69 MMHG

## 2022-09-29 VITALS — DIASTOLIC BLOOD PRESSURE: 55 MMHG

## 2022-09-29 VITALS — DIASTOLIC BLOOD PRESSURE: 51 MMHG | SYSTOLIC BLOOD PRESSURE: 104 MMHG

## 2022-09-29 VITALS — DIASTOLIC BLOOD PRESSURE: 70 MMHG

## 2022-09-29 LAB — INR BLD: 1 (ref 2–3.5)

## 2022-09-30 VITALS — DIASTOLIC BLOOD PRESSURE: 80 MMHG

## 2022-10-12 ENCOUNTER — HOSPITAL ENCOUNTER (OUTPATIENT)
Dept: HOSPITAL 83 - LAB | Age: 52
Discharge: HOME | End: 2022-10-12
Payer: COMMERCIAL

## 2022-10-12 DIAGNOSIS — I34.89: Primary | ICD-10-CM

## 2022-10-12 LAB — INR BLD: 1.4 (ref 2–3.5)

## 2022-10-14 ENCOUNTER — HOSPITAL ENCOUNTER (OUTPATIENT)
Dept: HOSPITAL 83 - LAB | Age: 52
Discharge: HOME | End: 2022-10-14
Payer: COMMERCIAL

## 2022-10-14 DIAGNOSIS — I34.0: Primary | ICD-10-CM

## 2022-10-14 DIAGNOSIS — I82.90: ICD-10-CM

## 2022-10-14 LAB — INR BLD: 1.9 (ref 2–3.5)

## 2022-10-17 ENCOUNTER — HOSPITAL ENCOUNTER (OUTPATIENT)
Dept: HOSPITAL 83 - WOUNDCARE | Age: 52
Discharge: HOME | End: 2022-10-17
Payer: COMMERCIAL

## 2022-10-17 DIAGNOSIS — I82.90: ICD-10-CM

## 2022-10-17 DIAGNOSIS — E66.9: ICD-10-CM
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
## 2022-10-17 DIAGNOSIS — N18.9: ICD-10-CM

## 2022-10-17 DIAGNOSIS — I48.91: ICD-10-CM

## 2022-10-17 DIAGNOSIS — J44.9: ICD-10-CM

## 2022-10-17 DIAGNOSIS — L97.312: ICD-10-CM

## 2022-10-17 DIAGNOSIS — T86.828: Primary | ICD-10-CM

## 2022-10-17 DIAGNOSIS — I34.0: ICD-10-CM

## 2022-10-17 DIAGNOSIS — G43.909: ICD-10-CM

## 2022-10-17 DIAGNOSIS — E11.622: ICD-10-CM

## 2022-10-17 DIAGNOSIS — E78.5: ICD-10-CM

## 2022-10-17 DIAGNOSIS — I87.2: ICD-10-CM

## 2022-10-17 DIAGNOSIS — F41.9: ICD-10-CM

## 2022-10-17 DIAGNOSIS — E11.22: ICD-10-CM

## 2022-10-17 LAB — INR BLD: 2.8 (ref 2–3.5)

## 2022-10-19 ENCOUNTER — HOSPITAL ENCOUNTER (OUTPATIENT)
Dept: HOSPITAL 83 - LAB | Age: 52
Discharge: HOME | End: 2022-10-19
Payer: COMMERCIAL

## 2022-10-19 DIAGNOSIS — I34.0: Primary | ICD-10-CM

## 2022-10-19 DIAGNOSIS — I82.90: ICD-10-CM

## 2022-10-19 LAB — INR BLD: 2.7 (ref 2–3.5)

## 2022-10-31 ENCOUNTER — HOSPITAL ENCOUNTER (OUTPATIENT)
Dept: HOSPITAL 83 - WOUNDCARE | Age: 52
Discharge: HOME | End: 2022-10-31
Payer: COMMERCIAL

## 2022-10-31 DIAGNOSIS — E11.22: ICD-10-CM

## 2022-10-31 DIAGNOSIS — F41.9: ICD-10-CM

## 2022-10-31 DIAGNOSIS — E78.5: ICD-10-CM

## 2022-10-31 DIAGNOSIS — Z87.891: ICD-10-CM

## 2022-10-31 DIAGNOSIS — I87.2: ICD-10-CM

## 2022-10-31 DIAGNOSIS — G43.909: ICD-10-CM

## 2022-10-31 DIAGNOSIS — N18.9: ICD-10-CM

## 2022-10-31 DIAGNOSIS — L97.312: ICD-10-CM

## 2022-10-31 DIAGNOSIS — J44.9: ICD-10-CM

## 2022-10-31 DIAGNOSIS — E66.9: ICD-10-CM

## 2022-10-31 DIAGNOSIS — I48.91: ICD-10-CM

## 2022-10-31 DIAGNOSIS — E11.622: Primary | ICD-10-CM

## 2022-11-07 ENCOUNTER — HOSPITAL ENCOUNTER (OUTPATIENT)
Dept: HOSPITAL 83 - WOUNDCARE | Age: 52
Discharge: HOME | End: 2022-11-07
Payer: COMMERCIAL

## 2022-11-07 DIAGNOSIS — L97.312: ICD-10-CM

## 2022-11-07 DIAGNOSIS — G43.909: ICD-10-CM

## 2022-11-07 DIAGNOSIS — Z95.4: ICD-10-CM

## 2022-11-07 DIAGNOSIS — N18.9: ICD-10-CM

## 2022-11-07 DIAGNOSIS — I48.91: ICD-10-CM

## 2022-11-07 DIAGNOSIS — Z87.891: ICD-10-CM

## 2022-11-07 DIAGNOSIS — E78.5: ICD-10-CM

## 2022-11-07 DIAGNOSIS — I87.2: ICD-10-CM

## 2022-11-07 DIAGNOSIS — E11.22: ICD-10-CM

## 2022-11-07 DIAGNOSIS — E11.622: Primary | ICD-10-CM

## 2022-11-07 DIAGNOSIS — E66.9: ICD-10-CM

## 2022-11-07 DIAGNOSIS — J44.9: ICD-10-CM

## 2022-11-07 DIAGNOSIS — F41.9: ICD-10-CM

## 2022-11-21 ENCOUNTER — HOSPITAL ENCOUNTER (OUTPATIENT)
Dept: HOSPITAL 83 - WOUNDCARE | Age: 52
Discharge: HOME | End: 2022-11-21
Payer: COMMERCIAL

## 2022-11-21 DIAGNOSIS — I87.2: ICD-10-CM

## 2022-11-21 DIAGNOSIS — I48.91: ICD-10-CM

## 2022-11-21 DIAGNOSIS — L97.312: ICD-10-CM

## 2022-11-21 DIAGNOSIS — J44.9: ICD-10-CM

## 2022-11-21 DIAGNOSIS — E11.22: ICD-10-CM

## 2022-11-21 DIAGNOSIS — E66.9: ICD-10-CM

## 2022-11-21 DIAGNOSIS — Z95.4: ICD-10-CM

## 2022-11-21 DIAGNOSIS — N18.9: ICD-10-CM

## 2022-11-21 DIAGNOSIS — F41.9: ICD-10-CM

## 2022-11-21 DIAGNOSIS — E78.5: ICD-10-CM

## 2022-11-21 DIAGNOSIS — G43.909: ICD-10-CM

## 2022-11-21 DIAGNOSIS — E11.622: Primary | ICD-10-CM

## 2022-11-21 DIAGNOSIS — Z87.891: ICD-10-CM

## 2022-11-25 ENCOUNTER — HOSPITAL ENCOUNTER (EMERGENCY)
Dept: HOSPITAL 83 - ED | Age: 52
Discharge: HOME | End: 2022-11-25
Payer: COMMERCIAL

## 2022-11-25 VITALS — WEIGHT: 250 LBS | BODY MASS INDEX: 35.79 KG/M2 | HEIGHT: 70 IN

## 2022-11-25 VITALS — SYSTOLIC BLOOD PRESSURE: 138 MMHG | DIASTOLIC BLOOD PRESSURE: 82 MMHG

## 2022-11-25 DIAGNOSIS — J44.9: ICD-10-CM

## 2022-11-25 DIAGNOSIS — Z87.891: ICD-10-CM

## 2022-11-25 DIAGNOSIS — Z79.899: ICD-10-CM

## 2022-11-25 DIAGNOSIS — E11.22: ICD-10-CM

## 2022-11-25 DIAGNOSIS — R07.9: Primary | ICD-10-CM

## 2022-11-25 DIAGNOSIS — Z88.8: ICD-10-CM

## 2022-11-25 DIAGNOSIS — Z98.890: ICD-10-CM

## 2022-11-25 DIAGNOSIS — N18.32: ICD-10-CM

## 2022-11-25 DIAGNOSIS — K21.9: ICD-10-CM

## 2022-11-25 LAB
ALP SERPL-CCNC: 117 U/L (ref 45–117)
ALT SERPL W P-5'-P-CCNC: 29 U/L (ref 12–78)
BASOPHILS # BLD AUTO: 0 10*3/UL (ref 0–0.1)
BASOPHILS NFR BLD AUTO: 0.4 % (ref 0–1)
BUN SERPL-MCNC: 20 MG/DL (ref 7–24)
CHLORIDE SERPL-SCNC: 107 MMOL/L (ref 98–107)
CREAT SERPL-MCNC: 1.7 MG/DL (ref 0.7–1.3)
EOSINOPHIL # BLD AUTO: 0.4 10*3/UL (ref 0–0.4)
EOSINOPHIL # BLD AUTO: 4.2 % (ref 1–4)
ERYTHROCYTE [DISTWIDTH] IN BLOOD BY AUTOMATED COUNT: 13.2 % (ref 0–14.5)
HCT VFR BLD AUTO: 41.6 % (ref 42–52)
LYMPHOCYTES # BLD AUTO: 2.9 10*3/UL (ref 1.3–4.4)
LYMPHOCYTES NFR BLD AUTO: 29.8 % (ref 27–41)
MCH RBC QN AUTO: 29 PG (ref 27–31)
MCHC RBC AUTO-ENTMCNC: 33.4 G/DL (ref 33–37)
MCV RBC AUTO: 86.7 FL (ref 80–94)
MONOCYTES # BLD AUTO: 0.8 10*3/UL (ref 0.1–1)
MONOCYTES NFR BLD MANUAL: 8.3 % (ref 3–9)
NEUT #: 5.4 10*3/UL (ref 2.3–7.9)
NEUT %: 56.9 % (ref 47–73)
NRBC BLD QL AUTO: 0 % (ref 0–0)
PLATELET # BLD AUTO: 243 10*3/UL (ref 130–400)
PMV BLD AUTO: 9.2 FL (ref 9.6–12.3)
POTASSIUM SERPL-SCNC: 4 MMOL/L (ref 3.5–5.1)
PROT SERPL-MCNC: 7 GM/DL (ref 6.4–8.2)
RBC # BLD AUTO: 4.8 10*6/UL (ref 4.5–5.9)
SODIUM SERPL-SCNC: 140 MMOL/L (ref 136–145)
WBC NRBC COR # BLD AUTO: 9.6 10*3/UL (ref 4.8–10.8)

## 2022-12-05 ENCOUNTER — HOSPITAL ENCOUNTER (OUTPATIENT)
Dept: HOSPITAL 83 - WOUNDCARE | Age: 52
Discharge: HOME | End: 2022-12-05
Payer: COMMERCIAL

## 2022-12-05 DIAGNOSIS — E78.5: ICD-10-CM

## 2022-12-05 DIAGNOSIS — L97.312: ICD-10-CM

## 2022-12-05 DIAGNOSIS — Z95.4: ICD-10-CM

## 2022-12-05 DIAGNOSIS — I48.91: ICD-10-CM

## 2022-12-05 DIAGNOSIS — I87.2: ICD-10-CM

## 2022-12-05 DIAGNOSIS — E11.22: ICD-10-CM

## 2022-12-05 DIAGNOSIS — F41.9: ICD-10-CM

## 2022-12-05 DIAGNOSIS — G43.909: ICD-10-CM

## 2022-12-05 DIAGNOSIS — E66.9: ICD-10-CM

## 2022-12-05 DIAGNOSIS — J44.9: ICD-10-CM

## 2022-12-05 DIAGNOSIS — N18.9: ICD-10-CM

## 2022-12-05 DIAGNOSIS — E11.622: Primary | ICD-10-CM

## 2022-12-05 DIAGNOSIS — Z87.891: ICD-10-CM

## 2022-12-12 ENCOUNTER — HOSPITAL ENCOUNTER (OUTPATIENT)
Dept: HOSPITAL 83 - WOUNDCARE | Age: 52
Discharge: HOME | End: 2022-12-12
Payer: COMMERCIAL

## 2022-12-12 DIAGNOSIS — E11.22: ICD-10-CM

## 2022-12-12 DIAGNOSIS — Z95.4: ICD-10-CM

## 2022-12-12 DIAGNOSIS — I87.2: ICD-10-CM

## 2022-12-12 DIAGNOSIS — Z87.891: ICD-10-CM

## 2022-12-12 DIAGNOSIS — L97.312: ICD-10-CM

## 2022-12-12 DIAGNOSIS — G43.909: ICD-10-CM

## 2022-12-12 DIAGNOSIS — E66.9: ICD-10-CM

## 2022-12-12 DIAGNOSIS — I48.91: ICD-10-CM

## 2022-12-12 DIAGNOSIS — E11.622: Primary | ICD-10-CM

## 2022-12-12 DIAGNOSIS — J44.9: ICD-10-CM

## 2022-12-12 DIAGNOSIS — E78.5: ICD-10-CM

## 2022-12-12 DIAGNOSIS — N18.9: ICD-10-CM

## 2022-12-12 DIAGNOSIS — F41.9: ICD-10-CM

## 2022-12-19 ENCOUNTER — HOSPITAL ENCOUNTER (OUTPATIENT)
Dept: HOSPITAL 83 - WOUNDCARE | Age: 52
Discharge: HOME | End: 2022-12-19
Payer: COMMERCIAL

## 2022-12-19 DIAGNOSIS — N18.9: ICD-10-CM

## 2022-12-19 DIAGNOSIS — E66.9: ICD-10-CM

## 2022-12-19 DIAGNOSIS — Z87.891: ICD-10-CM

## 2022-12-19 DIAGNOSIS — J44.9: ICD-10-CM

## 2022-12-19 DIAGNOSIS — Z95.4: ICD-10-CM

## 2022-12-19 DIAGNOSIS — E11.22: ICD-10-CM

## 2022-12-19 DIAGNOSIS — L97.312: ICD-10-CM

## 2022-12-19 DIAGNOSIS — E78.5: ICD-10-CM

## 2022-12-19 DIAGNOSIS — E11.622: Primary | ICD-10-CM

## 2022-12-19 DIAGNOSIS — I87.2: ICD-10-CM

## 2022-12-19 DIAGNOSIS — F41.9: ICD-10-CM

## 2022-12-19 DIAGNOSIS — G43.909: ICD-10-CM

## 2022-12-19 DIAGNOSIS — I48.91: ICD-10-CM

## 2023-01-09 ENCOUNTER — HOSPITAL ENCOUNTER (OUTPATIENT)
Dept: HOSPITAL 83 - WOUNDCARE | Age: 53
Discharge: HOME | End: 2023-01-09
Payer: COMMERCIAL

## 2023-01-09 DIAGNOSIS — E11.52: ICD-10-CM

## 2023-01-09 DIAGNOSIS — F41.9: ICD-10-CM

## 2023-01-09 DIAGNOSIS — E78.5: ICD-10-CM

## 2023-01-09 DIAGNOSIS — Z95.4: ICD-10-CM

## 2023-01-09 DIAGNOSIS — Z87.891: ICD-10-CM

## 2023-01-09 DIAGNOSIS — I96: ICD-10-CM

## 2023-01-09 DIAGNOSIS — N18.9: ICD-10-CM

## 2023-01-09 DIAGNOSIS — L97.312: ICD-10-CM

## 2023-01-09 DIAGNOSIS — J44.9: ICD-10-CM

## 2023-01-09 DIAGNOSIS — E66.9: ICD-10-CM

## 2023-01-09 DIAGNOSIS — E11.622: Primary | ICD-10-CM

## 2023-01-09 DIAGNOSIS — E11.22: ICD-10-CM

## 2023-01-09 DIAGNOSIS — G43.909: ICD-10-CM

## 2023-01-09 DIAGNOSIS — I48.91: ICD-10-CM

## 2023-01-09 DIAGNOSIS — I87.2: ICD-10-CM

## 2023-01-16 ENCOUNTER — HOSPITAL ENCOUNTER (OUTPATIENT)
Dept: HOSPITAL 83 - WOUNDCARE | Age: 53
Discharge: HOME | End: 2023-01-16
Payer: COMMERCIAL

## 2023-01-16 DIAGNOSIS — E66.9: ICD-10-CM

## 2023-01-16 DIAGNOSIS — G43.909: ICD-10-CM

## 2023-01-16 DIAGNOSIS — E78.5: ICD-10-CM

## 2023-01-16 DIAGNOSIS — F41.9: ICD-10-CM

## 2023-01-16 DIAGNOSIS — E11.622: Primary | ICD-10-CM

## 2023-01-16 DIAGNOSIS — I87.2: ICD-10-CM

## 2023-01-16 DIAGNOSIS — E11.22: ICD-10-CM

## 2023-01-16 DIAGNOSIS — I48.91: ICD-10-CM

## 2023-01-16 DIAGNOSIS — Z87.891: ICD-10-CM

## 2023-01-16 DIAGNOSIS — N18.9: ICD-10-CM

## 2023-01-16 DIAGNOSIS — J44.9: ICD-10-CM

## 2023-01-16 DIAGNOSIS — Z95.4: ICD-10-CM

## 2023-01-16 DIAGNOSIS — L97.312: ICD-10-CM

## 2023-01-23 ENCOUNTER — HOSPITAL ENCOUNTER (OUTPATIENT)
Dept: HOSPITAL 83 - WOUNDCARE | Age: 53
Discharge: HOME | End: 2023-01-23
Payer: COMMERCIAL

## 2023-01-23 DIAGNOSIS — E11.622: Primary | ICD-10-CM

## 2023-01-23 DIAGNOSIS — E11.22: ICD-10-CM

## 2023-01-23 DIAGNOSIS — G43.909: ICD-10-CM

## 2023-01-23 DIAGNOSIS — I87.2: ICD-10-CM

## 2023-01-23 DIAGNOSIS — J44.9: ICD-10-CM

## 2023-01-23 DIAGNOSIS — Z95.4: ICD-10-CM

## 2023-01-23 DIAGNOSIS — L97.312: ICD-10-CM

## 2023-01-23 DIAGNOSIS — E78.5: ICD-10-CM

## 2023-01-23 DIAGNOSIS — N18.9: ICD-10-CM

## 2023-01-23 DIAGNOSIS — E66.9: ICD-10-CM

## 2023-01-23 DIAGNOSIS — F41.9: ICD-10-CM

## 2023-01-23 DIAGNOSIS — Z87.891: ICD-10-CM

## 2023-01-23 DIAGNOSIS — I48.91: ICD-10-CM

## 2023-01-30 ENCOUNTER — HOSPITAL ENCOUNTER (OUTPATIENT)
Dept: HOSPITAL 83 - WOUNDCARE | Age: 53
Discharge: HOME | End: 2023-01-30
Payer: COMMERCIAL

## 2023-01-30 DIAGNOSIS — I48.91: ICD-10-CM

## 2023-01-30 DIAGNOSIS — E66.9: ICD-10-CM

## 2023-01-30 DIAGNOSIS — E78.5: ICD-10-CM

## 2023-01-30 DIAGNOSIS — L97.312: ICD-10-CM

## 2023-01-30 DIAGNOSIS — N18.9: ICD-10-CM

## 2023-01-30 DIAGNOSIS — J44.9: ICD-10-CM

## 2023-01-30 DIAGNOSIS — E11.622: Primary | ICD-10-CM

## 2023-01-30 DIAGNOSIS — E11.22: ICD-10-CM

## 2023-01-30 DIAGNOSIS — I87.2: ICD-10-CM

## 2023-01-30 DIAGNOSIS — Z87.891: ICD-10-CM

## 2023-01-30 DIAGNOSIS — Z95.4: ICD-10-CM

## 2023-01-30 DIAGNOSIS — G43.909: ICD-10-CM

## 2023-01-30 DIAGNOSIS — F41.9: ICD-10-CM

## 2023-02-06 ENCOUNTER — HOSPITAL ENCOUNTER (OUTPATIENT)
Dept: HOSPITAL 83 - WOUNDCARE | Age: 53
Discharge: HOME | End: 2023-02-06
Payer: COMMERCIAL

## 2023-02-06 DIAGNOSIS — E11.622: Primary | ICD-10-CM

## 2023-02-06 DIAGNOSIS — N18.9: ICD-10-CM

## 2023-02-06 DIAGNOSIS — E66.9: ICD-10-CM

## 2023-02-06 DIAGNOSIS — E78.5: ICD-10-CM

## 2023-02-06 DIAGNOSIS — F41.9: ICD-10-CM

## 2023-02-06 DIAGNOSIS — Z87.891: ICD-10-CM

## 2023-02-06 DIAGNOSIS — L84: ICD-10-CM

## 2023-02-06 DIAGNOSIS — E11.22: ICD-10-CM

## 2023-02-06 DIAGNOSIS — G43.909: ICD-10-CM

## 2023-02-06 DIAGNOSIS — I48.91: ICD-10-CM

## 2023-02-06 DIAGNOSIS — L97.312: ICD-10-CM

## 2023-02-06 DIAGNOSIS — I87.2: ICD-10-CM

## 2023-02-06 DIAGNOSIS — Z95.4: ICD-10-CM

## 2023-02-06 DIAGNOSIS — J44.9: ICD-10-CM

## 2023-02-20 ENCOUNTER — HOSPITAL ENCOUNTER (OUTPATIENT)
Dept: HOSPITAL 83 - WOUNDCARE | Age: 53
Discharge: HOME | End: 2023-02-20
Payer: COMMERCIAL

## 2023-02-20 DIAGNOSIS — E78.5: ICD-10-CM

## 2023-02-20 DIAGNOSIS — Z87.891: ICD-10-CM

## 2023-02-20 DIAGNOSIS — L84: ICD-10-CM

## 2023-02-20 DIAGNOSIS — E66.9: ICD-10-CM

## 2023-02-20 DIAGNOSIS — Z95.4: ICD-10-CM

## 2023-02-20 DIAGNOSIS — G43.909: ICD-10-CM

## 2023-02-20 DIAGNOSIS — F41.9: ICD-10-CM

## 2023-02-20 DIAGNOSIS — N18.9: ICD-10-CM

## 2023-02-20 DIAGNOSIS — L97.312: ICD-10-CM

## 2023-02-20 DIAGNOSIS — I87.2: ICD-10-CM

## 2023-02-20 DIAGNOSIS — E11.22: ICD-10-CM

## 2023-02-20 DIAGNOSIS — I48.91: ICD-10-CM

## 2023-02-20 DIAGNOSIS — E11.622: Primary | ICD-10-CM

## 2023-02-20 DIAGNOSIS — J44.9: ICD-10-CM

## 2023-02-27 ENCOUNTER — HOSPITAL ENCOUNTER (OUTPATIENT)
Dept: HOSPITAL 83 - WOUNDCARE | Age: 53
Discharge: HOME | End: 2023-02-27
Payer: COMMERCIAL

## 2023-02-27 DIAGNOSIS — F41.9: ICD-10-CM

## 2023-02-27 DIAGNOSIS — I87.2: ICD-10-CM

## 2023-02-27 DIAGNOSIS — J44.9: ICD-10-CM

## 2023-02-27 DIAGNOSIS — L84: ICD-10-CM

## 2023-02-27 DIAGNOSIS — E66.9: ICD-10-CM

## 2023-02-27 DIAGNOSIS — G43.909: ICD-10-CM

## 2023-02-27 DIAGNOSIS — L97.312: ICD-10-CM

## 2023-02-27 DIAGNOSIS — N18.9: ICD-10-CM

## 2023-02-27 DIAGNOSIS — E11.622: Primary | ICD-10-CM

## 2023-02-27 DIAGNOSIS — I48.91: ICD-10-CM

## 2023-02-27 DIAGNOSIS — E78.5: ICD-10-CM

## 2023-02-27 DIAGNOSIS — E11.22: ICD-10-CM

## 2023-02-27 DIAGNOSIS — Z95.4: ICD-10-CM

## 2023-02-27 DIAGNOSIS — Z87.891: ICD-10-CM

## 2023-03-13 ENCOUNTER — HOSPITAL ENCOUNTER (OUTPATIENT)
Dept: HOSPITAL 83 - WOUNDCARE | Age: 53
Discharge: HOME | End: 2023-03-13
Payer: COMMERCIAL

## 2023-03-13 DIAGNOSIS — I87.2: ICD-10-CM

## 2023-03-13 DIAGNOSIS — E66.9: ICD-10-CM

## 2023-03-13 DIAGNOSIS — N18.9: ICD-10-CM

## 2023-03-13 DIAGNOSIS — L84: ICD-10-CM

## 2023-03-13 DIAGNOSIS — G43.909: ICD-10-CM

## 2023-03-13 DIAGNOSIS — E78.5: ICD-10-CM

## 2023-03-13 DIAGNOSIS — Z95.4: ICD-10-CM

## 2023-03-13 DIAGNOSIS — L97.312: ICD-10-CM

## 2023-03-13 DIAGNOSIS — Z87.891: ICD-10-CM

## 2023-03-13 DIAGNOSIS — J44.9: ICD-10-CM

## 2023-03-13 DIAGNOSIS — E11.622: Primary | ICD-10-CM

## 2023-03-13 DIAGNOSIS — I48.91: ICD-10-CM

## 2023-03-13 DIAGNOSIS — F41.9: ICD-10-CM

## 2023-03-13 DIAGNOSIS — E11.22: ICD-10-CM

## 2023-04-03 ENCOUNTER — HOSPITAL ENCOUNTER (OUTPATIENT)
Dept: HOSPITAL 83 - WOUNDCARE | Age: 53
Discharge: HOME | End: 2023-04-03
Payer: COMMERCIAL

## 2023-04-03 DIAGNOSIS — E66.9: ICD-10-CM

## 2023-04-03 DIAGNOSIS — E78.5: ICD-10-CM

## 2023-04-03 DIAGNOSIS — Z87.891: ICD-10-CM

## 2023-04-03 DIAGNOSIS — N18.9: ICD-10-CM

## 2023-04-03 DIAGNOSIS — E11.22: ICD-10-CM

## 2023-04-03 DIAGNOSIS — G43.909: ICD-10-CM

## 2023-04-03 DIAGNOSIS — I48.91: ICD-10-CM

## 2023-04-03 DIAGNOSIS — L97.312: ICD-10-CM

## 2023-04-03 DIAGNOSIS — J44.9: ICD-10-CM

## 2023-04-03 DIAGNOSIS — I87.2: ICD-10-CM

## 2023-04-03 DIAGNOSIS — F41.9: ICD-10-CM

## 2023-04-03 DIAGNOSIS — E11.622: Primary | ICD-10-CM

## 2023-04-10 ENCOUNTER — HOSPITAL ENCOUNTER (OUTPATIENT)
Dept: HOSPITAL 83 - WOUNDCARE | Age: 53
Discharge: HOME | End: 2023-04-10
Payer: COMMERCIAL

## 2023-04-10 DIAGNOSIS — Z87.891: ICD-10-CM

## 2023-04-10 DIAGNOSIS — I87.2: ICD-10-CM

## 2023-04-10 DIAGNOSIS — N18.9: ICD-10-CM

## 2023-04-10 DIAGNOSIS — I48.91: ICD-10-CM

## 2023-04-10 DIAGNOSIS — Z95.4: ICD-10-CM

## 2023-04-10 DIAGNOSIS — G43.909: ICD-10-CM

## 2023-04-10 DIAGNOSIS — E11.622: Primary | ICD-10-CM

## 2023-04-10 DIAGNOSIS — L97.312: ICD-10-CM

## 2023-04-10 DIAGNOSIS — J44.9: ICD-10-CM

## 2023-04-10 DIAGNOSIS — E78.5: ICD-10-CM

## 2023-04-10 DIAGNOSIS — F41.9: ICD-10-CM

## 2023-04-10 DIAGNOSIS — E66.9: ICD-10-CM

## 2023-09-09 ENCOUNTER — HOSPITAL ENCOUNTER (OUTPATIENT)
Dept: HOSPITAL 83 - LAB | Age: 53
Discharge: HOME | End: 2023-09-09
Attending: THORACIC SURGERY (CARDIOTHORACIC VASCULAR SURGERY)
Payer: COMMERCIAL

## 2023-09-09 DIAGNOSIS — Z95.2: ICD-10-CM

## 2023-09-09 DIAGNOSIS — I82.90: Primary | ICD-10-CM

## 2023-09-09 LAB
BUN SERPL-MCNC: 13 MG/DL (ref 9–23)
CHLORIDE SERPL-SCNC: 106 MMOL/L (ref 98–107)
POTASSIUM SERPL-SCNC: 4.3 MMOL/L (ref 3.4–5.1)

## 2023-11-22 ENCOUNTER — HOSPITAL ENCOUNTER (OUTPATIENT)
Dept: HOSPITAL 83 - US | Age: 53
Discharge: HOME | End: 2023-11-22
Attending: INTERNAL MEDICINE
Payer: COMMERCIAL
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
## 2023-11-22 DIAGNOSIS — E11.65: ICD-10-CM
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
## 2023-11-22 DIAGNOSIS — S81.801A: Primary | ICD-10-CM
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
## 2023-11-22 DIAGNOSIS — E11.59: ICD-10-CM

## 2024-05-26 ENCOUNTER — HOSPITAL ENCOUNTER (EMERGENCY)
Dept: HOSPITAL 83 - ED | Age: 54
Discharge: HOME | End: 2024-05-26
Payer: COMMERCIAL

## 2024-05-26 VITALS — WEIGHT: 270 LBS | BODY MASS INDEX: 35.78 KG/M2 | HEIGHT: 73 IN

## 2024-05-26 VITALS — SYSTOLIC BLOOD PRESSURE: 135 MMHG | DIASTOLIC BLOOD PRESSURE: 80 MMHG

## 2024-05-26 DIAGNOSIS — I48.91: ICD-10-CM

## 2024-05-26 DIAGNOSIS — E11.22: ICD-10-CM

## 2024-05-26 DIAGNOSIS — J44.9: ICD-10-CM

## 2024-05-26 DIAGNOSIS — G43.909: ICD-10-CM

## 2024-05-26 DIAGNOSIS — K21.9: ICD-10-CM

## 2024-05-26 DIAGNOSIS — G45.9: Primary | ICD-10-CM

## 2024-05-26 DIAGNOSIS — N18.32: ICD-10-CM

## 2024-05-26 DIAGNOSIS — Z88.5: ICD-10-CM

## 2024-05-26 DIAGNOSIS — E11.65: ICD-10-CM

## 2024-05-26 DIAGNOSIS — Z87.891: ICD-10-CM

## 2024-05-26 DIAGNOSIS — F41.9: ICD-10-CM

## 2024-05-26 DIAGNOSIS — Z88.8: ICD-10-CM

## 2024-05-26 LAB
ALP SERPL-CCNC: 136 U/L (ref 46–116)
ALT SERPL W P-5'-P-CCNC: 20 U/L (ref 5–49)
APTT PPP: 37.1 SECONDS (ref 20–32.1)
BACTERIA #/AREA URNS HPF: (no result) /[HPF]
BASOPHILS # BLD AUTO: 0 10*3/UL (ref 0–0.1)
BASOPHILS NFR BLD AUTO: 0.4 % (ref 0–1)
BUN SERPL-MCNC: 10 MG/DL (ref 9–23)
CHLORIDE SERPL-SCNC: 103 MMOL/L (ref 98–107)
EOSINOPHIL # BLD AUTO: 0.4 10*3/UL (ref 0–0.4)
EOSINOPHIL # BLD AUTO: 4.5 % (ref 1–4)
ERYTHROCYTE [DISTWIDTH] IN BLOOD BY AUTOMATED COUNT: 14 % (ref 0–14.5)
ETHANOL SERPL-MCNC: < 3 MG/DL (ref ?–3)
HCT VFR BLD AUTO: 44.1 % (ref 42–52)
LYMPHOCYTES # BLD AUTO: 2 10*3/UL (ref 1.3–4.4)
LYMPHOCYTES NFR BLD AUTO: 21 % (ref 27–41)
MCH RBC QN AUTO: 29.8 PG (ref 27–31)
MCHC RBC AUTO-ENTMCNC: 32.9 G/DL (ref 33–37)
MCV RBC AUTO: 90.6 FL (ref 80–94)
MONOCYTES # BLD AUTO: 0.7 10*3/UL (ref 0.1–1)
MONOCYTES NFR BLD MANUAL: 7.4 % (ref 3–9)
NEUT #: 6.4 10*3/UL (ref 2.3–7.9)
NEUT %: 66.3 % (ref 47–73)
NRBC BLD QL AUTO: 0 % (ref 0–0)
PH UR STRIP: 5.5 [PH] (ref 4.5–8)
PLATELET # BLD AUTO: 240 10*3/UL (ref 130–400)
PMV BLD AUTO: 9 FL (ref 9.6–12.3)
POTASSIUM SERPL-SCNC: 3.8 MMOL/L (ref 3.4–5.1)
PROT SERPL-MCNC: 7.2 GM/DL (ref 6–8)
RBC # BLD AUTO: 4.87 10*6/UL (ref 4.5–5.9)
SP GR UR: 1.01 (ref 1–1.03)
UROBILINOGEN UR STRIP-MCNC: 0.2 E.U./DL (ref 0–1)
WBC #/AREA URNS HPF: (no result) WBC/HPF (ref 0–5)
WBC NRBC COR # BLD AUTO: 9.6 10*3/UL (ref 4.8–10.8)

## 2024-08-23 ENCOUNTER — HOSPITAL ENCOUNTER (OUTPATIENT)
Dept: HOSPITAL 83 - LAB | Age: 54
Discharge: HOME | End: 2024-08-23
Attending: INTERNAL MEDICINE
Payer: COMMERCIAL
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
## 2024-08-23 DIAGNOSIS — I70.8: ICD-10-CM

## 2024-08-23 DIAGNOSIS — S81.801D: Primary | ICD-10-CM

## 2024-08-23 DIAGNOSIS — E11.59: ICD-10-CM

## 2025-02-06 ENCOUNTER — HOSPITAL ENCOUNTER (OUTPATIENT)
Dept: HOSPITAL 83 - LAB | Age: 55
Discharge: HOME | End: 2025-02-06
Attending: STUDENT IN AN ORGANIZED HEALTH CARE EDUCATION/TRAINING PROGRAM
Payer: COMMERCIAL

## 2025-02-06 DIAGNOSIS — Z95.2: ICD-10-CM

## 2025-02-06 DIAGNOSIS — G62.9: ICD-10-CM

## 2025-02-06 DIAGNOSIS — E78.2: ICD-10-CM

## 2025-02-06 DIAGNOSIS — R29.6: Primary | ICD-10-CM

## 2025-02-06 DIAGNOSIS — E03.9: ICD-10-CM

## 2025-02-06 DIAGNOSIS — E11.9: ICD-10-CM

## 2025-02-06 LAB
25(OH)D3 SERPL-MCNC: 14.8 NG/ML (ref 30–100)
ALP SERPL-CCNC: 138 U/L (ref 46–116)
ALT SERPL W P-5'-P-CCNC: 19 U/L (ref 5–49)
BASOPHILS # BLD AUTO: 0 10*3/UL (ref 0–0.1)
BASOPHILS NFR BLD AUTO: 0.4 % (ref 0–1)
BUN SERPL-MCNC: 12 MG/DL (ref 9–23)
CHLORIDE SERPL-SCNC: 104 MMOL/L (ref 98–107)
CHOLEST SERPL-MCNC: 143 MG/DL (ref ?–200)
EOSINOPHIL # BLD AUTO: 0.3 10*3/UL (ref 0–0.4)
EOSINOPHIL # BLD AUTO: 4.1 % (ref 1–4)
ERYTHROCYTE [DISTWIDTH] IN BLOOD BY AUTOMATED COUNT: 12.8 % (ref 0–14.5)
HCT VFR BLD AUTO: 47 % (ref 42–52)
LDLC SERPL DIRECT ASSAY-MCNC: 66 MG/DL (ref 9–159)
MCH RBC QN AUTO: 29.8 PG (ref 27–31)
MCHC RBC AUTO-ENTMCNC: 33.4 G/DL (ref 33–37)
MCV RBC AUTO: 89.2 FL (ref 80–94)
MONOCYTES # BLD AUTO: 0.4 10*3/UL (ref 0.1–1)
MONOCYTES NFR BLD MANUAL: 5.2 % (ref 3–9)
NEUT #: 4.9 10*3/UL (ref 2.3–7.9)
NEUT %: 64.8 % (ref 47–73)
NRBC BLD QL AUTO: 0 % (ref 0–0)
PLATELET # BLD AUTO: 211 10*3/UL (ref 130–400)
PMV BLD AUTO: 9.2 FL (ref 9.6–12.3)
POTASSIUM SERPL-SCNC: 3.9 MMOL/L (ref 3.4–5.1)
PROT SERPL-MCNC: 6.9 GM/DL (ref 6–8)
RBC # BLD AUTO: 5.27 10*6/UL (ref 4.5–5.9)
WBC NRBC COR # BLD AUTO: 7.6 10*3/UL (ref 4.8–10.8)

## 2025-03-10 ENCOUNTER — APPOINTMENT (OUTPATIENT)
Dept: GENERAL RADIOLOGY | Age: 55
DRG: 291 | End: 2025-03-10
Payer: MEDICARE

## 2025-03-10 ENCOUNTER — APPOINTMENT (OUTPATIENT)
Dept: CT IMAGING | Age: 55
DRG: 291 | End: 2025-03-10
Payer: MEDICARE

## 2025-03-10 ENCOUNTER — HOSPITAL ENCOUNTER (INPATIENT)
Age: 55
LOS: 5 days | Discharge: LEFT BEFORE DISCHARGE | DRG: 291 | End: 2025-03-15
Attending: EMERGENCY MEDICINE | Admitting: STUDENT IN AN ORGANIZED HEALTH CARE EDUCATION/TRAINING PROGRAM
Payer: MEDICARE

## 2025-03-10 DIAGNOSIS — R06.02 SHORTNESS OF BREATH: ICD-10-CM

## 2025-03-10 DIAGNOSIS — J96.02 ACUTE RESPIRATORY FAILURE WITH HYPOXIA AND HYPERCAPNIA: Primary | ICD-10-CM

## 2025-03-10 DIAGNOSIS — J44.9 CHRONIC OBSTRUCTIVE PULMONARY DISEASE, UNSPECIFIED COPD TYPE (HCC): ICD-10-CM

## 2025-03-10 DIAGNOSIS — J96.01 ACUTE RESPIRATORY FAILURE WITH HYPOXIA AND HYPERCAPNIA: Primary | ICD-10-CM

## 2025-03-10 DIAGNOSIS — Z95.2 H/O MITRAL VALVE REPLACEMENT WITH MECHANICAL VALVE: ICD-10-CM

## 2025-03-10 PROBLEM — I50.9 CHF EXACERBATION (HCC): Status: ACTIVE | Noted: 2025-03-10

## 2025-03-10 PROBLEM — J44.1 COPD EXACERBATION (HCC): Status: ACTIVE | Noted: 2025-03-10

## 2025-03-10 PROBLEM — J96.22 ACUTE ON CHRONIC RESPIRATORY FAILURE WITH HYPOXIA AND HYPERCAPNIA: Status: ACTIVE | Noted: 2025-03-10

## 2025-03-10 PROBLEM — R40.0 SOMNOLENCE: Status: ACTIVE | Noted: 2025-03-10

## 2025-03-10 PROBLEM — J96.21 ACUTE ON CHRONIC RESPIRATORY FAILURE WITH HYPOXIA AND HYPERCAPNIA: Status: ACTIVE | Noted: 2025-03-10

## 2025-03-10 LAB
ALBUMIN SERPL-MCNC: 3.5 G/DL (ref 3.5–5.2)
ALP SERPL-CCNC: 106 U/L (ref 40–129)
ALT SERPL-CCNC: 19 U/L (ref 0–40)
AMPHET UR QL SCN: NEGATIVE
ANION GAP SERPL CALCULATED.3IONS-SCNC: 6 MMOL/L (ref 7–16)
AST SERPL-CCNC: 18 U/L (ref 0–39)
B PARAP IS1001 DNA NPH QL NAA+NON-PROBE: NOT DETECTED
B PERT DNA SPEC QL NAA+PROBE: NOT DETECTED
B.E.: 5.3 MMOL/L (ref -3–3)
B.E.: 5.9 MMOL/L (ref -3–3)
B.E.: 6 MMOL/L (ref -3–3)
B.E.: 7 MMOL/L (ref -3–3)
BARBITURATES UR QL SCN: NEGATIVE
BASOPHILS # BLD: 0.02 K/UL (ref 0–0.2)
BASOPHILS NFR BLD: 0 % (ref 0–2)
BENZODIAZ UR QL: POSITIVE
BILIRUB SERPL-MCNC: 0.4 MG/DL (ref 0–1.2)
BNP SERPL-MCNC: 2180 PG/ML (ref 0–125)
BUN SERPL-MCNC: 13 MG/DL (ref 6–20)
BUPRENORPHINE UR QL: NEGATIVE
C PNEUM DNA NPH QL NAA+NON-PROBE: NOT DETECTED
CALCIUM SERPL-MCNC: 8.4 MG/DL (ref 8.6–10.2)
CANNABINOIDS UR QL SCN: NEGATIVE
CHLORIDE SERPL-SCNC: 104 MMOL/L (ref 98–107)
CHP ED QC CHECK: NORMAL
CO2 SERPL-SCNC: 33 MMOL/L (ref 22–29)
COCAINE UR QL SCN: NEGATIVE
COHB: 2 % (ref 0–1.5)
COHB: 2.2 % (ref 0–1.5)
COHB: 2.5 % (ref 0–1.5)
COHB: 3.1 % (ref 0–1.5)
CREAT SERPL-MCNC: 1.1 MG/DL (ref 0.7–1.2)
CRITICAL: ABNORMAL
DATE ANALYZED: ABNORMAL
DATE OF COLLECTION: ABNORMAL
EOSINOPHIL # BLD: 0.38 K/UL (ref 0.05–0.5)
EOSINOPHILS RELATIVE PERCENT: 4 % (ref 0–6)
ERYTHROCYTE [DISTWIDTH] IN BLOOD BY AUTOMATED COUNT: 15.1 % (ref 11.5–15)
FENTANYL UR QL: NEGATIVE
FERRITIN SERPL-MCNC: 94 NG/ML
FIO2: 40 %
FLUAV RNA NPH QL NAA+NON-PROBE: NOT DETECTED
FLUBV RNA NPH QL NAA+NON-PROBE: NOT DETECTED
GFR, ESTIMATED: 76 ML/MIN/1.73M2
GLUCOSE BLD-MCNC: 143 MG/DL (ref 74–99)
GLUCOSE BLD-MCNC: 205 MG/DL (ref 74–99)
GLUCOSE BLD-MCNC: 98 MG/DL
GLUCOSE BLD-MCNC: 98 MG/DL (ref 74–99)
GLUCOSE SERPL-MCNC: 93 MG/DL (ref 74–99)
HADV DNA NPH QL NAA+NON-PROBE: NOT DETECTED
HCO3: 34.2 MMOL/L (ref 22–26)
HCO3: 34.7 MMOL/L (ref 22–26)
HCO3: 34.8 MMOL/L (ref 22–26)
HCO3: 35.1 MMOL/L (ref 22–26)
HCOV 229E RNA NPH QL NAA+NON-PROBE: NOT DETECTED
HCOV HKU1 RNA NPH QL NAA+NON-PROBE: NOT DETECTED
HCOV NL63 RNA NPH QL NAA+NON-PROBE: NOT DETECTED
HCOV OC43 RNA NPH QL NAA+NON-PROBE: NOT DETECTED
HCT VFR BLD AUTO: 41.4 % (ref 37–54)
HGB BLD-MCNC: 12.8 G/DL (ref 12.5–16.5)
HHB: 2.6 % (ref 0–5)
HHB: 4.5 % (ref 0–5)
HHB: 5.6 % (ref 0–5)
HHB: 6.6 % (ref 0–5)
HMPV RNA NPH QL NAA+NON-PROBE: NOT DETECTED
HPIV1 RNA NPH QL NAA+NON-PROBE: NOT DETECTED
HPIV2 RNA NPH QL NAA+NON-PROBE: NOT DETECTED
HPIV3 RNA NPH QL NAA+NON-PROBE: NOT DETECTED
HPIV4 RNA NPH QL NAA+NON-PROBE: NOT DETECTED
IMM GRANULOCYTES # BLD AUTO: 0.08 K/UL (ref 0–0.58)
IMM GRANULOCYTES NFR BLD: 1 % (ref 0–5)
INFLUENZA A BY PCR: NOT DETECTED
INFLUENZA B BY PCR: NOT DETECTED
INR PPP: 1.5
IRON SATN MFR SERPL: 12 % (ref 20–55)
IRON SERPL-MCNC: 36 UG/DL (ref 59–158)
LAB: ABNORMAL
LACTATE BLDV-SCNC: 0.8 MMOL/L (ref 0.5–2.2)
LIPASE SERPL-CCNC: 33 U/L (ref 13–60)
LYMPHOCYTES NFR BLD: 1.65 K/UL (ref 1.5–4)
LYMPHOCYTES RELATIVE PERCENT: 16 % (ref 20–42)
Lab: 1318
Lab: 1527
Lab: 1715
Lab: 2112
M PNEUMO DNA NPH QL NAA+NON-PROBE: NOT DETECTED
MAGNESIUM SERPL-MCNC: 2.4 MG/DL (ref 1.6–2.6)
MCH RBC QN AUTO: 30 PG (ref 26–35)
MCHC RBC AUTO-ENTMCNC: 30.9 G/DL (ref 32–34.5)
MCV RBC AUTO: 97.2 FL (ref 80–99.9)
METHADONE UR QL: NEGATIVE
METHB: 0.3 % (ref 0–1.5)
MODE: ABNORMAL
MONOCYTES NFR BLD: 0.81 K/UL (ref 0.1–0.95)
MONOCYTES NFR BLD: 8 % (ref 2–12)
NEUTROPHILS NFR BLD: 71 % (ref 43–80)
NEUTS SEG NFR BLD: 7.28 K/UL (ref 1.8–7.3)
O2 CONTENT: 18.4 ML/DL
O2 CONTENT: 18.6 ML/DL
O2 CONTENT: 18.8 ML/DL
O2 CONTENT: 20.3 ML/DL
O2 SATURATION: 93.2 % (ref 92–98.5)
O2 SATURATION: 94.3 % (ref 92–98.5)
O2 SATURATION: 95.4 % (ref 92–98.5)
O2 SATURATION: 97.3 % (ref 92–98.5)
O2HB: 90 % (ref 94–97)
O2HB: 91.9 % (ref 94–97)
O2HB: 92.7 % (ref 94–97)
O2HB: 95.1 % (ref 94–97)
OPERATOR ID: 1661
OPERATOR ID: 1661
OPERATOR ID: 316
OPERATOR ID: 913
OPIATES UR QL SCN: NEGATIVE
OXYCODONE UR QL SCN: NEGATIVE
PATIENT TEMP: 37 C
PCO2: 62.2 MMHG (ref 35–45)
PCO2: 69.5 MMHG (ref 35–45)
PCO2: 70.5 MMHG (ref 35–45)
PCO2: 73 MMHG (ref 35–45)
PCP UR QL SCN: NEGATIVE
PEEP/CPAP: 8 CMH2O
PFO2: 1.85 MMHG/%
PFO2: 2.05 MMHG/%
PFO2: 2.53 MMHG/%
PH BLOOD GAS: 7.3 (ref 7.35–7.45)
PH BLOOD GAS: 7.31 (ref 7.35–7.45)
PH BLOOD GAS: 7.31 (ref 7.35–7.45)
PH BLOOD GAS: 7.36 (ref 7.35–7.45)
PLATELET # BLD AUTO: 234 K/UL (ref 130–450)
PMV BLD AUTO: 9.2 FL (ref 7–12)
PO2: 101.1 MMHG (ref 75–100)
PO2: 72.5 MMHG (ref 75–100)
PO2: 74.2 MMHG (ref 75–100)
PO2: 81.8 MMHG (ref 75–100)
POTASSIUM SERPL-SCNC: 3.9 MMOL/L (ref 3.5–5)
PROT SERPL-MCNC: 6.4 G/DL (ref 6.4–8.3)
PROTHROMBIN TIME: 15.7 SEC (ref 9.3–12.4)
PS: 18 CMH20
RBC # BLD AUTO: 4.26 M/UL (ref 3.8–5.8)
RI(T): 1.03
RI(T): 1.5
RI(T): 1.88
RR MECHANICAL: 14 B/MIN
RR MECHANICAL: 18 B/MIN
RSV RNA NPH QL NAA+NON-PROBE: NOT DETECTED
RV+EV RNA NPH QL NAA+NON-PROBE: NOT DETECTED
SARS-COV-2 RDRP RESP QL NAA+PROBE: NOT DETECTED
SARS-COV-2 RNA NPH QL NAA+NON-PROBE: NOT DETECTED
SODIUM SERPL-SCNC: 143 MMOL/L (ref 132–146)
SOURCE, BLOOD GAS: ABNORMAL
SPECIMEN DESCRIPTION: NORMAL
SPECIMEN DESCRIPTION: NORMAL
TEST INFORMATION: ABNORMAL
THB: 14.1 G/DL (ref 11.5–16.5)
THB: 14.5 G/DL (ref 11.5–16.5)
THB: 14.7 G/DL (ref 11.5–16.5)
THB: 15.1 G/DL (ref 11.5–16.5)
TIBC SERPL-MCNC: 309 UG/DL (ref 250–450)
TIME ANALYZED: 1336
TIME ANALYZED: 1533
TIME ANALYZED: 1717
TIME ANALYZED: 2120
TROPONIN I SERPL HS-MCNC: 28 NG/L (ref 0–11)
TROPONIN I SERPL HS-MCNC: 32 NG/L (ref 0–11)
TSH SERPL DL<=0.05 MIU/L-ACNC: 1.84 UIU/ML (ref 0.27–4.2)
WBC OTHER # BLD: 10.2 K/UL (ref 4.5–11.5)

## 2025-03-10 PROCEDURE — 83540 ASSAY OF IRON: CPT

## 2025-03-10 PROCEDURE — 6370000000 HC RX 637 (ALT 250 FOR IP)

## 2025-03-10 PROCEDURE — 6360000002 HC RX W HCPCS

## 2025-03-10 PROCEDURE — 85025 COMPLETE CBC W/AUTO DIFF WBC: CPT

## 2025-03-10 PROCEDURE — 94640 AIRWAY INHALATION TREATMENT: CPT

## 2025-03-10 PROCEDURE — 93005 ELECTROCARDIOGRAM TRACING: CPT

## 2025-03-10 PROCEDURE — 84484 ASSAY OF TROPONIN QUANT: CPT

## 2025-03-10 PROCEDURE — 82728 ASSAY OF FERRITIN: CPT

## 2025-03-10 PROCEDURE — 96374 THER/PROPH/DIAG INJ IV PUSH: CPT

## 2025-03-10 PROCEDURE — 82962 GLUCOSE BLOOD TEST: CPT

## 2025-03-10 PROCEDURE — 6370000000 HC RX 637 (ALT 250 FOR IP): Performed by: EMERGENCY MEDICINE

## 2025-03-10 PROCEDURE — 73130 X-RAY EXAM OF HAND: CPT

## 2025-03-10 PROCEDURE — 85610 PROTHROMBIN TIME: CPT

## 2025-03-10 PROCEDURE — 94660 CPAP INITIATION&MGMT: CPT

## 2025-03-10 PROCEDURE — 83735 ASSAY OF MAGNESIUM: CPT

## 2025-03-10 PROCEDURE — 2060000000 HC ICU INTERMEDIATE R&B

## 2025-03-10 PROCEDURE — 71045 X-RAY EXAM CHEST 1 VIEW: CPT

## 2025-03-10 PROCEDURE — 99285 EMERGENCY DEPT VISIT HI MDM: CPT

## 2025-03-10 PROCEDURE — 94664 DEMO&/EVAL PT USE INHALER: CPT

## 2025-03-10 PROCEDURE — 80143 DRUG ASSAY ACETAMINOPHEN: CPT

## 2025-03-10 PROCEDURE — 83690 ASSAY OF LIPASE: CPT

## 2025-03-10 PROCEDURE — 80179 DRUG ASSAY SALICYLATE: CPT

## 2025-03-10 PROCEDURE — 6360000002 HC RX W HCPCS: Performed by: STUDENT IN AN ORGANIZED HEALTH CARE EDUCATION/TRAINING PROGRAM

## 2025-03-10 PROCEDURE — 80307 DRUG TEST PRSMV CHEM ANLYZR: CPT

## 2025-03-10 PROCEDURE — 6360000002 HC RX W HCPCS: Performed by: EMERGENCY MEDICINE

## 2025-03-10 PROCEDURE — 70450 CT HEAD/BRAIN W/O DYE: CPT

## 2025-03-10 PROCEDURE — 82805 BLOOD GASES W/O2 SATURATION: CPT

## 2025-03-10 PROCEDURE — 83550 IRON BINDING TEST: CPT

## 2025-03-10 PROCEDURE — 2500000003 HC RX 250 WO HCPCS: Performed by: STUDENT IN AN ORGANIZED HEALTH CARE EDUCATION/TRAINING PROGRAM

## 2025-03-10 PROCEDURE — 83605 ASSAY OF LACTIC ACID: CPT

## 2025-03-10 PROCEDURE — 87502 INFLUENZA DNA AMP PROBE: CPT

## 2025-03-10 PROCEDURE — 83880 ASSAY OF NATRIURETIC PEPTIDE: CPT

## 2025-03-10 PROCEDURE — 2500000003 HC RX 250 WO HCPCS

## 2025-03-10 PROCEDURE — 80053 COMPREHEN METABOLIC PANEL: CPT

## 2025-03-10 PROCEDURE — 99223 1ST HOSP IP/OBS HIGH 75: CPT | Performed by: STUDENT IN AN ORGANIZED HEALTH CARE EDUCATION/TRAINING PROGRAM

## 2025-03-10 PROCEDURE — 73090 X-RAY EXAM OF FOREARM: CPT

## 2025-03-10 PROCEDURE — 0202U NFCT DS 22 TRGT SARS-COV-2: CPT

## 2025-03-10 PROCEDURE — 87635 SARS-COV-2 COVID-19 AMP PRB: CPT

## 2025-03-10 PROCEDURE — 96375 TX/PRO/DX INJ NEW DRUG ADDON: CPT

## 2025-03-10 PROCEDURE — G0480 DRUG TEST DEF 1-7 CLASSES: HCPCS

## 2025-03-10 PROCEDURE — 84443 ASSAY THYROID STIM HORMONE: CPT

## 2025-03-10 PROCEDURE — 5A09357 ASSISTANCE WITH RESPIRATORY VENTILATION, LESS THAN 24 CONSECUTIVE HOURS, CONTINUOUS POSITIVE AIRWAY PRESSURE: ICD-10-PCS | Performed by: EMERGENCY MEDICINE

## 2025-03-10 RX ORDER — POTASSIUM CHLORIDE 7.45 MG/ML
10 INJECTION INTRAVENOUS PRN
Status: DISCONTINUED | OUTPATIENT
Start: 2025-03-10 | End: 2025-03-15 | Stop reason: HOSPADM

## 2025-03-10 RX ORDER — HYDROCODONE BITARTRATE AND ACETAMINOPHEN 7.5; 325 MG/1; MG/1
1 TABLET ORAL EVERY 6 HOURS PRN
Refills: 0 | Status: DISCONTINUED | OUTPATIENT
Start: 2025-03-10 | End: 2025-03-15 | Stop reason: HOSPADM

## 2025-03-10 RX ORDER — ALBUTEROL SULFATE 90 UG/1
1 INHALANT RESPIRATORY (INHALATION) EVERY 4 HOURS PRN
COMMUNITY

## 2025-03-10 RX ORDER — ONDANSETRON 4 MG/1
4 TABLET, ORALLY DISINTEGRATING ORAL EVERY 8 HOURS PRN
Status: DISCONTINUED | OUTPATIENT
Start: 2025-03-10 | End: 2025-03-15 | Stop reason: HOSPADM

## 2025-03-10 RX ORDER — DEXTROSE MONOHYDRATE 100 MG/ML
INJECTION, SOLUTION INTRAVENOUS CONTINUOUS PRN
Status: DISCONTINUED | OUTPATIENT
Start: 2025-03-10 | End: 2025-03-15 | Stop reason: HOSPADM

## 2025-03-10 RX ORDER — OLANZAPINE 5 MG/1
1 TABLET ORAL
Status: ON HOLD | COMMUNITY
End: 2025-03-10 | Stop reason: SINTOL

## 2025-03-10 RX ORDER — NICOTINE 21 MG/24HR
1 PATCH, TRANSDERMAL 24 HOURS TRANSDERMAL DAILY
Status: DISCONTINUED | OUTPATIENT
Start: 2025-03-10 | End: 2025-03-15 | Stop reason: HOSPADM

## 2025-03-10 RX ORDER — FUROSEMIDE 10 MG/ML
40 INJECTION INTRAMUSCULAR; INTRAVENOUS 2 TIMES DAILY
Status: DISCONTINUED | OUTPATIENT
Start: 2025-03-10 | End: 2025-03-15 | Stop reason: HOSPADM

## 2025-03-10 RX ORDER — GLIMEPIRIDE 1 MG/1
1 TABLET ORAL EVERY MORNING
COMMUNITY

## 2025-03-10 RX ORDER — DEXAMETHASONE SODIUM PHOSPHATE 10 MG/ML
10 INJECTION, SOLUTION INTRA-ARTICULAR; INTRALESIONAL; INTRAMUSCULAR; INTRAVENOUS; SOFT TISSUE ONCE
Status: COMPLETED | OUTPATIENT
Start: 2025-03-10 | End: 2025-03-10

## 2025-03-10 RX ORDER — HYDROCODONE BITARTRATE AND ACETAMINOPHEN 7.5; 325 MG/1; MG/1
1 TABLET ORAL EVERY 6 HOURS PRN
COMMUNITY

## 2025-03-10 RX ORDER — DIAZEPAM 5 MG/1
10 TABLET ORAL 4 TIMES DAILY PRN
Status: DISCONTINUED | OUTPATIENT
Start: 2025-03-10 | End: 2025-03-15 | Stop reason: HOSPADM

## 2025-03-10 RX ORDER — ENOXAPARIN SODIUM 150 MG/ML
1 INJECTION SUBCUTANEOUS EVERY 12 HOURS
Status: DISCONTINUED | OUTPATIENT
Start: 2025-03-10 | End: 2025-03-15 | Stop reason: HOSPADM

## 2025-03-10 RX ORDER — OLANZAPINE 15 MG/1
15 TABLET ORAL NIGHTLY
Status: ON HOLD | COMMUNITY
Start: 2025-01-20 | End: 2025-03-10 | Stop reason: SINTOL

## 2025-03-10 RX ORDER — SODIUM CHLORIDE 0.9 % (FLUSH) 0.9 %
5-40 SYRINGE (ML) INJECTION EVERY 12 HOURS SCHEDULED
Status: DISCONTINUED | OUTPATIENT
Start: 2025-03-10 | End: 2025-03-15 | Stop reason: HOSPADM

## 2025-03-10 RX ORDER — SODIUM CHLORIDE 9 MG/ML
INJECTION, SOLUTION INTRAVENOUS PRN
Status: DISCONTINUED | OUTPATIENT
Start: 2025-03-10 | End: 2025-03-15 | Stop reason: HOSPADM

## 2025-03-10 RX ORDER — ALBUTEROL SULFATE 0.63 MG/3ML
0.63 SOLUTION RESPIRATORY (INHALATION) EVERY 4 HOURS PRN
Status: DISCONTINUED | OUTPATIENT
Start: 2025-03-10 | End: 2025-03-15 | Stop reason: HOSPADM

## 2025-03-10 RX ORDER — ATORVASTATIN CALCIUM 40 MG/1
80 TABLET, FILM COATED ORAL DAILY
Status: DISCONTINUED | OUTPATIENT
Start: 2025-03-10 | End: 2025-03-15 | Stop reason: HOSPADM

## 2025-03-10 RX ORDER — WARFARIN SODIUM 4 MG/1
4 TABLET ORAL
Status: DISPENSED | OUTPATIENT
Start: 2025-03-10 | End: 2025-03-11

## 2025-03-10 RX ORDER — IPRATROPIUM BROMIDE AND ALBUTEROL SULFATE 2.5; .5 MG/3ML; MG/3ML
1 SOLUTION RESPIRATORY (INHALATION)
Status: DISCONTINUED | OUTPATIENT
Start: 2025-03-10 | End: 2025-03-15 | Stop reason: HOSPADM

## 2025-03-10 RX ORDER — MAGNESIUM SULFATE IN WATER 40 MG/ML
2000 INJECTION, SOLUTION INTRAVENOUS PRN
Status: DISCONTINUED | OUTPATIENT
Start: 2025-03-10 | End: 2025-03-15 | Stop reason: HOSPADM

## 2025-03-10 RX ORDER — FUROSEMIDE 20 MG/1
20 TABLET ORAL DAILY
COMMUNITY

## 2025-03-10 RX ORDER — INSULIN LISPRO 100 [IU]/ML
0-4 INJECTION, SOLUTION INTRAVENOUS; SUBCUTANEOUS EVERY 6 HOURS SCHEDULED
Status: DISCONTINUED | OUTPATIENT
Start: 2025-03-11 | End: 2025-03-13

## 2025-03-10 RX ORDER — CLOPIDOGREL BISULFATE 75 MG/1
75 TABLET ORAL DAILY
Status: DISCONTINUED | OUTPATIENT
Start: 2025-03-10 | End: 2025-03-15 | Stop reason: HOSPADM

## 2025-03-10 RX ORDER — SERTRALINE HYDROCHLORIDE 100 MG/1
100 TABLET, FILM COATED ORAL 2 TIMES DAILY
Status: DISCONTINUED | OUTPATIENT
Start: 2025-03-10 | End: 2025-03-15 | Stop reason: HOSPADM

## 2025-03-10 RX ORDER — METHYLPREDNISOLONE SODIUM SUCCINATE 40 MG/ML
40 INJECTION INTRAMUSCULAR; INTRAVENOUS EVERY 12 HOURS
Status: DISCONTINUED | OUTPATIENT
Start: 2025-03-10 | End: 2025-03-13

## 2025-03-10 RX ORDER — ATORVASTATIN CALCIUM 80 MG/1
80 TABLET, FILM COATED ORAL DAILY
COMMUNITY
Start: 2024-12-31

## 2025-03-10 RX ORDER — INSULIN LISPRO 100 [IU]/ML
0-4 INJECTION, SOLUTION INTRAVENOUS; SUBCUTANEOUS
Status: DISCONTINUED | OUTPATIENT
Start: 2025-03-10 | End: 2025-03-10

## 2025-03-10 RX ORDER — CLOPIDOGREL BISULFATE 75 MG/1
75 TABLET ORAL DAILY
COMMUNITY

## 2025-03-10 RX ORDER — POLYETHYLENE GLYCOL 3350 17 G/17G
17 POWDER, FOR SOLUTION ORAL DAILY PRN
Status: DISCONTINUED | OUTPATIENT
Start: 2025-03-10 | End: 2025-03-15 | Stop reason: HOSPADM

## 2025-03-10 RX ORDER — GABAPENTIN 400 MG/1
400 CAPSULE ORAL 3 TIMES DAILY
Status: DISCONTINUED | OUTPATIENT
Start: 2025-03-10 | End: 2025-03-15 | Stop reason: HOSPADM

## 2025-03-10 RX ORDER — ALBUTEROL SULFATE 90 UG/1
1 INHALANT RESPIRATORY (INHALATION) EVERY 4 HOURS PRN
Status: DISCONTINUED | OUTPATIENT
Start: 2025-03-11 | End: 2025-03-10 | Stop reason: CLARIF

## 2025-03-10 RX ORDER — DIAZEPAM 10 MG/1
10 TABLET ORAL 4 TIMES DAILY PRN
COMMUNITY
Start: 2025-02-17

## 2025-03-10 RX ORDER — ENOXAPARIN SODIUM 150 MG/ML
1 INJECTION SUBCUTANEOUS 2 TIMES DAILY
Status: DISCONTINUED | OUTPATIENT
Start: 2025-03-10 | End: 2025-03-10

## 2025-03-10 RX ORDER — METHYLPHENIDATE HYDROCHLORIDE 10 MG/1
20 TABLET ORAL 2 TIMES DAILY
Refills: 0 | Status: DISCONTINUED | OUTPATIENT
Start: 2025-03-10 | End: 2025-03-15 | Stop reason: HOSPADM

## 2025-03-10 RX ORDER — ONDANSETRON 2 MG/ML
4 INJECTION INTRAMUSCULAR; INTRAVENOUS EVERY 6 HOURS PRN
Status: DISCONTINUED | OUTPATIENT
Start: 2025-03-10 | End: 2025-03-15 | Stop reason: HOSPADM

## 2025-03-10 RX ORDER — POTASSIUM CHLORIDE 1500 MG/1
40 TABLET, EXTENDED RELEASE ORAL PRN
Status: DISCONTINUED | OUTPATIENT
Start: 2025-03-10 | End: 2025-03-15 | Stop reason: HOSPADM

## 2025-03-10 RX ORDER — SODIUM CHLORIDE 0.9 % (FLUSH) 0.9 %
5-40 SYRINGE (ML) INJECTION PRN
Status: DISCONTINUED | OUTPATIENT
Start: 2025-03-10 | End: 2025-03-15 | Stop reason: HOSPADM

## 2025-03-10 RX ORDER — GLUCAGON 1 MG/ML
1 KIT INJECTION PRN
Status: DISCONTINUED | OUTPATIENT
Start: 2025-03-10 | End: 2025-03-15 | Stop reason: HOSPADM

## 2025-03-10 RX ORDER — FUROSEMIDE 10 MG/ML
40 INJECTION INTRAMUSCULAR; INTRAVENOUS ONCE
Status: COMPLETED | OUTPATIENT
Start: 2025-03-10 | End: 2025-03-10

## 2025-03-10 RX ORDER — IPRATROPIUM BROMIDE AND ALBUTEROL SULFATE 2.5; .5 MG/3ML; MG/3ML
1 SOLUTION RESPIRATORY (INHALATION)
Status: COMPLETED | OUTPATIENT
Start: 2025-03-10 | End: 2025-03-10

## 2025-03-10 RX ORDER — GABAPENTIN 400 MG/1
400 CAPSULE ORAL 3 TIMES DAILY
COMMUNITY

## 2025-03-10 RX ORDER — NALOXONE HYDROCHLORIDE 0.4 MG/ML
0.4 INJECTION, SOLUTION INTRAMUSCULAR; INTRAVENOUS; SUBCUTANEOUS PRN
Status: DISCONTINUED | OUTPATIENT
Start: 2025-03-10 | End: 2025-03-10

## 2025-03-10 RX ORDER — BUMETANIDE 0.25 MG/ML
2 INJECTION, SOLUTION INTRAMUSCULAR; INTRAVENOUS DAILY
Status: DISCONTINUED | OUTPATIENT
Start: 2025-03-10 | End: 2025-03-10

## 2025-03-10 RX ORDER — WARFARIN SODIUM 2 MG/1
2 TABLET ORAL DAILY
COMMUNITY
Start: 2024-12-31

## 2025-03-10 RX ORDER — NALOXONE HYDROCHLORIDE 0.4 MG/ML
1 INJECTION, SOLUTION INTRAMUSCULAR; INTRAVENOUS; SUBCUTANEOUS PRN
Status: DISCONTINUED | OUTPATIENT
Start: 2025-03-10 | End: 2025-03-15 | Stop reason: HOSPADM

## 2025-03-10 RX ORDER — METHYLPHENIDATE HYDROCHLORIDE 20 MG/1
20 TABLET ORAL 2 TIMES DAILY
COMMUNITY

## 2025-03-10 RX ADMIN — SODIUM CHLORIDE, PRESERVATIVE FREE 10 ML: 5 INJECTION INTRAVENOUS at 20:25

## 2025-03-10 RX ADMIN — METHYLPREDNISOLONE SODIUM SUCCINATE 40 MG: 40 INJECTION INTRAMUSCULAR; INTRAVENOUS at 20:24

## 2025-03-10 RX ADMIN — INSULIN LISPRO 1 UNITS: 100 INJECTION, SOLUTION INTRAVENOUS; SUBCUTANEOUS at 20:26

## 2025-03-10 RX ADMIN — DEXAMETHASONE SODIUM PHOSPHATE 10 MG: 10 INJECTION INTRAMUSCULAR; INTRAVENOUS at 13:48

## 2025-03-10 RX ADMIN — NALXONE HYDROCHLORIDE 0.4 MG: 0.4 INJECTION INTRAMUSCULAR; INTRAVENOUS; SUBCUTANEOUS at 13:45

## 2025-03-10 RX ADMIN — FUROSEMIDE 40 MG: 10 INJECTION, SOLUTION INTRAMUSCULAR; INTRAVENOUS at 20:24

## 2025-03-10 RX ADMIN — ENOXAPARIN SODIUM 120 MG: 150 INJECTION SUBCUTANEOUS at 20:27

## 2025-03-10 RX ADMIN — FUROSEMIDE 40 MG: 10 INJECTION, SOLUTION INTRAMUSCULAR; INTRAVENOUS at 14:38

## 2025-03-10 RX ADMIN — IPRATROPIUM BROMIDE AND ALBUTEROL SULFATE 1 DOSE: .5; 3 SOLUTION RESPIRATORY (INHALATION) at 13:53

## 2025-03-10 RX ADMIN — IPRATROPIUM BROMIDE AND ALBUTEROL SULFATE 1 DOSE: .5; 3 SOLUTION RESPIRATORY (INHALATION) at 13:54

## 2025-03-10 RX ADMIN — WATER 1000 MG: 1 INJECTION INTRAMUSCULAR; INTRAVENOUS; SUBCUTANEOUS at 20:27

## 2025-03-10 RX ADMIN — ARFORMOTEROL TARTRATE: 15 SOLUTION RESPIRATORY (INHALATION) at 21:40

## 2025-03-10 RX ADMIN — IPRATROPIUM BROMIDE AND ALBUTEROL SULFATE 1 DOSE: .5; 3 SOLUTION RESPIRATORY (INHALATION) at 13:55

## 2025-03-10 RX ADMIN — IPRATROPIUM BROMIDE AND ALBUTEROL SULFATE 1 DOSE: 2.5; .5 SOLUTION RESPIRATORY (INHALATION) at 21:40

## 2025-03-10 ASSESSMENT — ENCOUNTER SYMPTOMS
COUGH: 1
SHORTNESS OF BREATH: 1

## 2025-03-10 ASSESSMENT — PAIN SCALES - GENERAL: PAINLEVEL_OUTOF10: 0

## 2025-03-10 ASSESSMENT — PAIN - FUNCTIONAL ASSESSMENT: PAIN_FUNCTIONAL_ASSESSMENT: NONE - DENIES PAIN

## 2025-03-10 NOTE — PROGRESS NOTES
Database initiated. Patient is A&O comes in from home with daughter. States he uses a cane and is RA at baseline. He has fallen recently. States his mother just passed away here today at our hospital.

## 2025-03-10 NOTE — PLAN OF CARE
Problem: ABCDS Injury Assessment  Goal: Absence of physical injury  Outcome: Progressing     Problem: Discharge Planning  Goal: Discharge to home or other facility with appropriate resources  Outcome: Progressing     Problem: Chronic Conditions and Co-morbidities  Goal: Patient's chronic conditions and co-morbidity symptoms are monitored and maintained or improved  Outcome: Progressing     Problem: Pain  Goal: Verbalizes/displays adequate comfort level or baseline comfort level  Outcome: Progressing

## 2025-03-10 NOTE — ED NOTES
Pt unable to ambulate at this time patient very lethargic provider notified and at bedside to assess

## 2025-03-10 NOTE — ED PROVIDER NOTES
Tablet 60.00 30 Me Bro [id number] Carin ([id number]) 0  [place]   12/23/2024 12/23/2024 1 Diazepam 10 Mg Tablet 120.00 30 Me Bro [id number] Her ([id number]) 0 4.00 LME Comm Ins        Nursing Notes were all reviewed and agreed with or any disagreements were addressed in the HPI.    REVIEW OF SYSTEMS :      Review of Systems   Respiratory:  Positive for cough and shortness of breath.        Positives and Pertinent negatives as per HPI.     SURGICAL HISTORY     Past Surgical History:   Procedure Laterality Date    MITRAL VALVE REPLACEMENT  06/12/2014    OTHER SURGICAL HISTORY Right 03/13/2017    right I&D partial tibial anterior removal , Bone biopsy.    PACEMAKER PLACEMENT  06/13/2015    cardiac       CURRENTMEDICATIONS       Current Discharge Medication List        CONTINUE these medications which have NOT CHANGED    Details   !! OLANZapine (ZYPREXA) 15 MG tablet Take 1 tablet by mouth nightly      atorvastatin (LIPITOR) 80 MG tablet Take 1 tablet by mouth daily      diazePAM (VALIUM) 10 MG tablet Take 1 tablet by mouth 4 times daily as needed for Anxiety.      warfarin (COUMADIN) 2 MG tablet Take 1 tablet by mouth daily      albuterol sulfate HFA (PROVENTIL;VENTOLIN;PROAIR) 108 (90 Base) MCG/ACT inhaler Inhale 1 puff into the lungs every 4 hours as needed for Wheezing or Shortness of Breath      clopidogrel (PLAVIX) 75 MG tablet Take 1 tablet by mouth daily      furosemide (LASIX) 20 MG tablet Take 1 tablet by mouth daily      gabapentin (NEURONTIN) 400 MG capsule Take 1 capsule by mouth 3 times daily.      glimepiride (AMARYL) 1 MG tablet Take 1 tablet by mouth every morning      HYDROcodone-acetaminophen (NORCO) 7.5-325 MG per tablet Take 1 tablet by mouth every 6 hours as needed for Pain.      methylphenidate (RITALIN) 20 MG tablet Take 1 tablet by mouth 2 times daily.      !! OLANZapine (ZYPREXA) 5 MG tablet Take 1 tablet by mouth Every Day      sertraline (ZOLOFT) 100 MG tablet Take 1 tablet by mouth 2 times daily  Refills: 1     Troponin, High Sensitivity 32 (*)     All other components within normal limits   BRAIN NATRIURETIC PEPTIDE - Abnormal; Notable for the following components:    NT Pro-BNP 2,180 (*)     All other components within normal limits   SERUM DRUG SCREEN - Abnormal; Notable for the following components:    Acetaminophen Level <5 (*)     All other components within normal limits   URINE DRUG SCREEN - Abnormal; Notable for the following components:    Benzodiazepine Screen, Urine POSITIVE (*)     All other components within normal limits   BLOOD GAS, ARTERIAL - Abnormal; Notable for the following components:    pH, Blood Gas 7.300 (*)     PCO2 73.0 (*)     PO2 72.5 (*)     HCO3 35.1 (*)     B.E. 5.9 (*)     O2Hb 90.0 (*)     COHb 3.1 (*)     HHb 6.6 (*)     All other components within normal limits   TROPONIN - Abnormal; Notable for the following components:    Troponin, High Sensitivity 28 (*)     All other components within normal limits   BLOOD GAS, ARTERIAL - Abnormal; Notable for the following components:    pH, Blood Gas 7.311 (*)     PCO2 70.5 (*)     HCO3 34.8 (*)     B.E. 6.0 (*)     O2Hb 92.7 (*)     COHb 2.5 (*)     All other components within normal limits   BLOOD GAS, ARTERIAL - Abnormal; Notable for the following components:    PCO2 62.2 (*)     PO2 74.2 (*)     HCO3 34.7 (*)     B.E. 7.0 (*)     O2Hb 91.9 (*)     COHb 2.2 (*)     HHb 5.6 (*)     All other components within normal limits   POCT GLUCOSE - Abnormal; Notable for the following components:    POC Glucose 143 (*)     All other components within normal limits   POCT GLUCOSE - Normal   COVID-19, RAPID   INFLUENZA A + B, PCR   RESPIRATORY PANEL, MOLECULAR, WITH COVID-19   LACTIC ACID   LIPASE   MAGNESIUM   TSH   BLOOD GAS, ARTERIAL   BLOOD GAS, ARTERIAL   IRON AND TIBC   FERRITIN   BLOOD GAS, ARTERIAL   BLOOD GAS, ARTERIAL   AMMONIA   PROTIME-INR   BASIC METABOLIC PANEL   HEPATIC FUNCTION PANEL   MAGNESIUM   LIPID PANEL   HEMOGLOBIN A1C   CBC WITH

## 2025-03-10 NOTE — PROGRESS NOTES
4 Eyes Skin Assessment     NAME:  Taye Hollis  YOB: 1970  MEDICAL RECORD NUMBER:  [id number]    The patient is being assessed for  Admission    I agree that at least one RN has performed a thorough Head to Toe Skin Assessment on the patient. ALL assessment sites listed below have been assessed.      Areas assessed by both nurses:    Head, Face, Ears, Shoulders, Back, Chest, Arms, Elbows, Hands, Sacrum. Buttock, Coccyx, Ischium, Legs. Feet and Heels, and Under Medical Devices         Does the Patient have a Wound? Yes wound(s) were present on assessment. LDA wound assessment was Initiated and completed by RN       Saurav Prevention initiated by RN: Yes  Wound Care Orders initiated by RN: Yes    Pressure Injury (Stage 3,4, Unstageable, DTI, NWPT, and Complex wounds) if present, place Wound referral order by RN under : No    New Ostomies, if present place, Ostomy referral order under : No     Nurse 1 eSignature: Electronically signed by Ray Perez RN on 3/10/25 at 7:00 PM EDT    **SHARE this note so that the co-signing nurse can place an eSignature**    Nurse 2 eSignature: Electronically signed by Cheri Traore RN on 3/10/25 at 7:01 PM EDT

## 2025-03-10 NOTE — PROGRESS NOTES
Pharmacy Consultation Note  (Warfarin Dosing and Monitoring)    Initial consult date: 3/10  Consulting Provider: KACEY García Bunny is a 55 y.o. male for whom pharmacy has been asked to manage warfarin therapy.     Weight:   Wt Readings from Last 1 Encounters:   03/10/25 118.4 kg (261 lb)       TSH:    Lab Results   Component Value Date/Time    TSH 1.84 03/10/2025 12:13 PM       Hepatic Function Panel:                            Lab Results   Component Value Date/Time    ALKPHOS 106 03/10/2025 12:13 PM    ALT 19 03/10/2025 12:13 PM    AST 18 03/10/2025 12:13 PM    BILITOT 0.4 03/10/2025 12:13 PM       Current significant warfarin drug-drug interactions include:   Ceftiaxone, methylprednisolone: may enhance anticoagulatory effect of warfarin    Recent Labs     03/10/25  1213   HGB 12.8        Date Warfarin Dose INR Heparin or LMWH Comment   3/10 2 mg (already taken this morning)  4 mg tonight  1.5 Enoxaparin 120 mg subQ twice daily                                  Assessment:  Patient is a 55 y.o. male on warfarin for Mechanical Heart Valve (mitral).  Patient's home warfarin dosing regimen is 2 mg once daily is what the patient reports to me.  Patient also tells me that he took his last dose of warfarin this morning   Goal INR 2.5 - 3.5  INR 1.5 today    Plan:  Patient reports to have already taken his 2 mg dose of warfarin this morning.  Being that the INR is subtherapeutic today, at 1.5, will give additional dose of warfarin this evening and continue an evening warfarin dose while the patient remains inpatient.   Warfarin 4 mg po x 1 tonight, with plan to likely dose patient with warfarin 4 mg po nightly.   Daily PT/INR until the INR is stable within the therapeutic range  Pharmacist will follow and monitor/adjust dosing as necessary        Remi Osborn PharmD 3/10/2025 5:34 PM   Ext: [phone]    LIAS: [phone]  SEY: [phone]  SJW: [phone]

## 2025-03-10 NOTE — PROGRESS NOTES
Date: 3/10/2025    Time: 2:23 PM    Patient Placed On BIPAP/CPAP/ Non-Invasive Ventilation?  Yes    If no must comment.  Facial area red/color change? No           If YES are Blister/Lesion present?No   If yes must notify nursing staff  BIPAP/CPAP skin barrier?  Yes    Skin barrier type:mepilex       Comments:        Kellee Chaudhry, KACEY    03/10/25 1422   NIV Type   $NIV $Daily Charge   NIV Started/Stopped On   Equipment Type V60   Mode Bilevel   Mask Type Full face mask   Mask Size Large   Assessment   Pulse 71   Respirations 16   SpO2 100 %   Settings/Measurements   PIP Observed 19 cm H20   IPAP 18 cmH20   CPAP/EPAP 8 cmH2O   Vt (Measured) 688 mL   Rate Ordered 16   Insp Rise Time (%) 3 %   FiO2  40 %   I Time/ I Time % 1 s   Minute Volume (L/min) 13.5 Liters   Mask Leak (lpm) 77 lpm  (patient has beard)   Patient's Home Machine No   Alarm Settings   Alarms On Y

## 2025-03-10 NOTE — ED NOTES
ED to Inpatient Handoff Report    Notified lilia that electronic handoff available and patient ready for transport to room 442.    Safety Risks: Difficulty with daily activities and Risk of falls    Patient in Restraints: no    Constant Observer or Patient : no    Telemetry Monitoring Ordered :Yes           Order to transfer to unit without monitor:NO    Last MEWS: 1 Time completed: 1758    Deterioration Index Score:   Predictive Model Details          29 (Normal)  Factor Value    Calculated 3/10/2025 18:04 39% Supplemental oxygen PAP (positive airway pressure)    Deterioration Index Model 33% Age 55 years old     8% Respiratory rate 18     8% Sodium 143 mmol/L     8% Systolic 102     4% WBC count 10.2 k/uL     0% Pulse oximetry 97 %     0% Temperature 98.7 °F (37.1 °C)     0% Pulse 71     0% Blood pH 7.364     0% Potassium 3.9 mmol/L     0% Hematocrit 41.4 %        Vitals:    03/10/25 1422 03/10/25 1438 03/10/25 1445 03/10/25 1758   BP:  139/79 99/64 102/67   Pulse: 71  70 71   Resp: 16  17 18   Temp:    98.7 °F (37.1 °C)   TempSrc:    Oral   SpO2: 100%  99% 97%   Weight:       Height:             Opportunity for questions and clarification was provided.

## 2025-03-10 NOTE — H&P
[facility] Hospitalist Group   History and Physical      CHIEF COMPLAINT: SOB    History of Present Illness:  55 y.o. 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  Of note, patient was admitted at [facility] and signed out AMA yesterday. Patient follows with pain clinic and was there today and found to be hypoxic at 88% on room air.  Patient does have a history of COPD but wears no baseline O2.  Pain clinic placed patient on 4L NC.  Patient reports he did have a cough 2 days ago with some productive brown sputum, but this has since resolved.  Patient also reports he did fall yesterday and landed on his left hand.  Denies head lack.  Patient denies loss of consciousness.  Is on warfarin for mechanical heart valve.  Patient was altered upon admission to EMS as well.  Patient was given Narcan in ED which did help with mentation.  CT head showed no acute intracranial abnormality.  CXR showed cardiomegaly with mild pulmonary vascular congestion and trace bilateral pleural effusions.  X-ray left hand and left radius ulna showed no acute fracture or dislocation.  Pertinent abnormal labs: CO2 33, anion gap 6, calcium 8.4, troponin 32, repeat 28, BNP 2180, urine drug screen positive for benzodiazepine.  Respiratory panel pending, iron and TIBC and ferritin pending.  Most recent blood gas: pH 7.311, pCO2 70.5, pO2 81.8, HCO3 34.8 on bilevel.  ED course included 10 mg Decadron, 40 mg IV Lasix, DuoNeb, Narcan.    Informant(s) for H&P: Patient and EMR    REVIEW OF SYSTEMS:  SOB.  No fevers, chills, cp, n/v, ha, vision/hearing changes, wt changes, hot/cold flashes, other open skin lesions, diarrhea, constipation, dysuria/hematuria unless noted in HPI. Complete ROS performed with the patient and is      No acute intracranial abnormality.       EKG:     ASSESSMENT:      Principal Problem:    Acute on chronic respiratory failure with hypoxia and hypercapnia (HCC)  Resolved Problems:    * No resolved hospital problems. *       PLAN:    Acute on chronic respiratory failure with hypoxia and hypercapnia-heart failure exacerbation vs. COPD exacerbation.  Patient requires no baseline O2, now requiring bilevel.  Wean O2 as tolerated.  Scheduled breathing treatments.  COPD-acute exacerbation.  No wheezing noted on auscultation.  No leukocytosis noted on CBC.  Steroids, breathing treatments, ceftriaxone ordered.  Acute heart failure exacerbation-echo in 7/2023 showed EF 60 to 65%.  CXR showed cardiomegaly and mild pulmonary vascular congestion.  BNP elevated at 2180. Lasix 40mg BID.  Monitor BMP, I's and O's, daily weights. Consult cardiology, appreciate their input.  Consult to heart failure nurse coordinator. Echo ordered.  Mechanical fall- CT head showed no acute intracranial abnormality. XR L arm/forearm negative for acute fracture/dislocation. PT/OT to eval.   Coronary artery disease-s/p PCI 2022.  Continue warfarin.  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  Continue Lovenox twice daily.  Pacemaker-interrogate.  Monitor on telemetry.  DM type II-hold metformin.  BG ACHS, SSI, hypoglycemic protocol.  A1c ordered.  HTN-stable.  Continue home medications as able.  HLD-continue statin.  Tobacco use-cessation education. Nicotine patch ordered.   Right lower extremity wound-patient follows with podiatry.  Consult wound care.    Code Status: Full  DVT prophylaxis: lovenox    NOTE: This report was transcribed using voice recognition software. Every effort was made to ensure accuracy; however, inadvertent computerized transcription errors may be present.     Electronically signed by JAMMIE Lord CNP on 3/10/2025 at 5:15 PM

## 2025-03-11 ENCOUNTER — APPOINTMENT (OUTPATIENT)
Age: 55
DRG: 291 | End: 2025-03-11
Payer: MEDICARE

## 2025-03-11 PROBLEM — I50.33 ACUTE ON CHRONIC DIASTOLIC HEART FAILURE (HCC): Status: ACTIVE | Noted: 2025-03-11

## 2025-03-11 PROBLEM — R79.1 SUBTHERAPEUTIC INTERNATIONAL NORMALIZED RATIO (INR): Status: ACTIVE | Noted: 2025-03-11

## 2025-03-11 PROBLEM — I25.10 CORONARY ARTERY DISEASE INVOLVING NATIVE CORONARY ARTERY OF NATIVE HEART WITHOUT ANGINA PECTORIS: Status: ACTIVE | Noted: 2025-03-11

## 2025-03-11 PROBLEM — Z79.01 WARFARIN ANTICOAGULATION: Status: ACTIVE | Noted: 2025-03-11

## 2025-03-11 PROBLEM — Z95.0 NORMALLY FUNCTIONING CARDIAC PACEMAKER PRESENT: Status: ACTIVE | Noted: 2025-03-11

## 2025-03-11 LAB
ALBUMIN SERPL-MCNC: 3.6 G/DL (ref 3.5–5.2)
ALP SERPL-CCNC: 111 U/L (ref 40–129)
ALT SERPL-CCNC: 17 U/L (ref 0–40)
AMMONIA PLAS-SCNC: 28 UMOL/L (ref 16–60)
ANION GAP SERPL CALCULATED.3IONS-SCNC: 8 MMOL/L (ref 7–16)
AST SERPL-CCNC: 17 U/L (ref 0–39)
B.E.: 9 MMOL/L (ref -3–3)
B.E.: 9.8 MMOL/L (ref -3–3)
BASOPHILS # BLD: 0 K/UL (ref 0–0.2)
BASOPHILS NFR BLD: 0 % (ref 0–2)
BILIRUB DIRECT SERPL-MCNC: <0.2 MG/DL (ref 0–0.3)
BILIRUB INDIRECT SERPL-MCNC: NORMAL MG/DL (ref 0–1)
BILIRUB SERPL-MCNC: 0.5 MG/DL (ref 0–1.2)
BUN SERPL-MCNC: 19 MG/DL (ref 6–20)
CALCIUM SERPL-MCNC: 8.7 MG/DL (ref 8.6–10.2)
CHLORIDE SERPL-SCNC: 99 MMOL/L (ref 98–107)
CHOLEST SERPL-MCNC: 136 MG/DL
CO2 SERPL-SCNC: 35 MMOL/L (ref 22–29)
COHB: 1.5 % (ref 0–1.5)
COHB: 1.6 % (ref 0–1.5)
COMMENT: ABNORMAL
COMMENT: ABNORMAL
CREAT SERPL-MCNC: 1.1 MG/DL (ref 0.7–1.2)
CRITICAL: ABNORMAL
CRITICAL: ABNORMAL
DATE ANALYZED: ABNORMAL
DATE ANALYZED: ABNORMAL
DATE OF COLLECTION: ABNORMAL
DATE OF COLLECTION: ABNORMAL
ECHO AO ASC DIAM: 3.2 CM
ECHO AO ASCENDING AORTA INDEX: 1.29 CM/M2
ECHO AO SINUS VALSALVA DIAM: 3.2 CM
ECHO AO SINUS VALSALVA INDEX: 1.29 CM/M2
ECHO AV AREA PEAK VELOCITY: 2.7 CM2
ECHO AV AREA VTI: 3.2 CM2
ECHO AV AREA/BSA PEAK VELOCITY: 1.1 CM2/M2
ECHO AV AREA/BSA VTI: 1.3 CM2/M2
ECHO AV CUSP MM: 1.8 CM
ECHO AV MEAN GRADIENT: 5 MMHG
ECHO AV MEAN VELOCITY: 1.1 M/S
ECHO AV PEAK GRADIENT: 8 MMHG
ECHO AV PEAK VELOCITY: 1.4 M/S
ECHO AV VELOCITY RATIO: 0.71
ECHO AV VTI: 30.9 CM
ECHO BSA: 2.52 M2
ECHO EST RA PRESSURE: 3 MMHG
ECHO LA VOL A-L A2C: 76 ML (ref 18–58)
ECHO LA VOL A-L A4C: 73 ML (ref 18–58)
ECHO LA VOL MOD A2C: 71 ML (ref 18–58)
ECHO LA VOL MOD A4C: 70 ML (ref 18–58)
ECHO LA VOLUME AREA LENGTH: 76 ML
ECHO LA VOLUME INDEX A-L A2C: 31 ML/M2 (ref 16–34)
ECHO LA VOLUME INDEX A-L A4C: 29 ML/M2 (ref 16–34)
ECHO LA VOLUME INDEX AREA LENGTH: 31 ML/M2 (ref 16–34)
ECHO LA VOLUME INDEX MOD A2C: 29 ML/M2 (ref 16–34)
ECHO LA VOLUME INDEX MOD A4C: 28 ML/M2 (ref 16–34)
ECHO LV E' LATERAL VELOCITY: 7 CM/S
ECHO LV E' SEPTAL VELOCITY: 5 CM/S
ECHO LV EDV A2C: 60 ML
ECHO LV EDV A4C: 80 ML
ECHO LV EDV BP: 70 ML (ref 67–155)
ECHO LV EDV INDEX A4C: 32 ML/M2
ECHO LV EDV INDEX BP: 28 ML/M2
ECHO LV EDV NDEX A2C: 24 ML/M2
ECHO LV EF PHYSICIAN: 65 %
ECHO LV EJECTION FRACTION A2C: 54 %
ECHO LV EJECTION FRACTION A4C: 56 %
ECHO LV ESV A2C: 27 ML
ECHO LV ESV A4C: 35 ML
ECHO LV ESV BP: 33 ML (ref 22–58)
ECHO LV ESV INDEX A2C: 11 ML/M2
ECHO LV ESV INDEX A4C: 14 ML/M2
ECHO LV ESV INDEX BP: 13 ML/M2
ECHO LV FRACTIONAL SHORTENING: 30 % (ref 28–44)
ECHO LV INTERNAL DIMENSION DIASTOLE INDEX: 2.14 CM/M2
ECHO LV INTERNAL DIMENSION DIASTOLIC: 5.3 CM (ref 4.2–5.9)
ECHO LV INTERNAL DIMENSION SYSTOLIC INDEX: 1.49 CM/M2
ECHO LV INTERNAL DIMENSION SYSTOLIC: 3.7 CM
ECHO LV ISOVOLUMETRIC RELAXATION TIME (IVRT): 73.8 MS
ECHO LV IVSD: 1.2 CM (ref 0.6–1)
ECHO LV IVSS: 1.5 CM
ECHO LV MASS 2D: 242 G (ref 88–224)
ECHO LV MASS INDEX 2D: 97.6 G/M2 (ref 49–115)
ECHO LV POSTERIOR WALL DIASTOLIC: 1.1 CM (ref 0.6–1)
ECHO LV POSTERIOR WALL SYSTOLIC: 1.6 CM
ECHO LV RELATIVE WALL THICKNESS RATIO: 0.42
ECHO LVOT AREA: 3.8 CM2
ECHO LVOT AV VTI INDEX: 0.89
ECHO LVOT DIAM: 2.2 CM
ECHO LVOT MEAN GRADIENT: 3 MMHG
ECHO LVOT PEAK GRADIENT: 4 MMHG
ECHO LVOT PEAK VELOCITY: 1 M/S
ECHO LVOT STROKE VOLUME INDEX: 42.1 ML/M2
ECHO LVOT SV: 104.5 ML
ECHO LVOT VTI: 27.5 CM
ECHO MV "A" WAVE DURATION: 161.5 MSEC
ECHO MV AREA PHT: 1.5 CM2
ECHO MV AREA VTI: 1.3 CM2
ECHO MV LVOT VTI INDEX: 2.91
ECHO MV MAX VELOCITY: 2.5 M/S
ECHO MV MEAN GRADIENT: 11 MMHG
ECHO MV MEAN VELOCITY: 1.6 M/S
ECHO MV PEAK GRADIENT: 24 MMHG
ECHO MV PRESSURE HALF TIME (PHT): 143.3 MS
ECHO MV VTI: 80 CM
ECHO PV MAX VELOCITY: 1 M/S
ECHO PV MEAN GRADIENT: 2 MMHG
ECHO PV MEAN VELOCITY: 0.7 M/S
ECHO PV PEAK GRADIENT: 4 MMHG
ECHO PV VTI: 19.6 CM
ECHO RIGHT VENTRICULAR SYSTOLIC PRESSURE (RVSP): 33 MMHG
ECHO RV BASAL DIMENSION: 5.7 CM
ECHO RV INTERNAL DIMENSION: 3.7 CM
ECHO RV LONGITUDINAL DIMENSION: 7.5 CM
ECHO RV MID DIMENSION: 4.5 CM
ECHO RV TAPSE: 1.6 CM (ref 1.7–?)
ECHO TV REGURGITANT MAX VELOCITY: 2.76 M/S
ECHO TV REGURGITANT PEAK GRADIENT: 30 MMHG
EOSINOPHIL # BLD: 0 K/UL (ref 0.05–0.5)
EOSINOPHILS RELATIVE PERCENT: 0 % (ref 0–6)
ERYTHROCYTE [DISTWIDTH] IN BLOOD BY AUTOMATED COUNT: 14.5 % (ref 11.5–15)
FIO2: 40 %
FIO2: 40 %
GFR, ESTIMATED: 77 ML/MIN/1.73M2
GLUCOSE BLD-MCNC: 158 MG/DL (ref 74–99)
GLUCOSE BLD-MCNC: 175 MG/DL (ref 74–99)
GLUCOSE BLD-MCNC: 186 MG/DL (ref 74–99)
GLUCOSE BLD-MCNC: 227 MG/DL (ref 74–99)
GLUCOSE BLD-MCNC: 252 MG/DL (ref 74–99)
GLUCOSE SERPL-MCNC: 172 MG/DL (ref 74–99)
HBA1C MFR BLD: 6.3 % (ref 4–5.6)
HCO3: 35.2 MMOL/L (ref 22–26)
HCO3: 40.6 MMOL/L (ref 22–26)
HCT VFR BLD AUTO: 37.5 % (ref 37–54)
HDLC SERPL-MCNC: 45 MG/DL
HGB BLD-MCNC: 11.7 G/DL (ref 12.5–16.5)
HHB: 2.3 % (ref 0–5)
HHB: 2.6 % (ref 0–5)
IMM GRANULOCYTES # BLD AUTO: 0.05 K/UL (ref 0–0.58)
IMM GRANULOCYTES NFR BLD: 1 % (ref 0–5)
INR PPP: 1.5
LAB: ABNORMAL
LAB: ABNORMAL
LDLC SERPL CALC-MCNC: 71 MG/DL
LYMPHOCYTES NFR BLD: 0.43 K/UL (ref 1.5–4)
LYMPHOCYTES RELATIVE PERCENT: 5 % (ref 20–42)
Lab: 406
Lab: 54
MAGNESIUM SERPL-MCNC: 2.3 MG/DL (ref 1.6–2.6)
MCH RBC QN AUTO: 29.5 PG (ref 26–35)
MCHC RBC AUTO-ENTMCNC: 31.2 G/DL (ref 32–34.5)
MCV RBC AUTO: 94.5 FL (ref 80–99.9)
METHB: 0 % (ref 0–1.5)
METHB: 0.3 % (ref 0–1.5)
MODE: ABNORMAL
MODE: ABNORMAL
MONOCYTES NFR BLD: 0.15 K/UL (ref 0.1–0.95)
MONOCYTES NFR BLD: 2 % (ref 2–12)
NEUTROPHILS NFR BLD: 93 % (ref 43–80)
NEUTS SEG NFR BLD: 8.19 K/UL (ref 1.8–7.3)
O2 CONTENT: 19.8 ML/DL
O2 CONTENT: 20.2 ML/DL
O2 SATURATION: 97.4 % (ref 92–98.5)
O2 SATURATION: 97.7 % (ref 92–98.5)
O2HB: 95.8 % (ref 94–97)
O2HB: 95.9 % (ref 94–97)
OPERATOR ID: 7296
OPERATOR ID: 7296
PATIENT TEMP: 37 C
PATIENT TEMP: 37 C
PCO2: 49.7 MMHG (ref 35–45)
PCO2: 94.9 MMHG (ref 35–45)
PEEP/CPAP: 8 CMH2O
PEEP/CPAP: 8 CMH2O
PFO2: 2.37 MMHG/%
PFO2: 2.67 MMHG/%
PH BLOOD GAS: 7.25 (ref 7.35–7.45)
PH BLOOD GAS: 7.47 (ref 7.35–7.45)
PLATELET # BLD AUTO: 218 K/UL (ref 130–450)
PMV BLD AUTO: 9.4 FL (ref 7–12)
PO2: 107 MMHG (ref 75–100)
PO2: 94.6 MMHG (ref 75–100)
POTASSIUM SERPL-SCNC: 4 MMOL/L (ref 3.5–5)
PROT SERPL-MCNC: 6.8 G/DL (ref 6.4–8.3)
PROTHROMBIN TIME: 15.5 SEC (ref 9.3–12.4)
RBC # BLD AUTO: 3.97 M/UL (ref 3.8–5.8)
RBC # BLD: ABNORMAL 10*6/UL
RI(T): 0.65
RI(T): 1.41
RR MECHANICAL: 25 B/MIN
RR MECHANICAL: 26 B/MIN
SODIUM SERPL-SCNC: 142 MMOL/L (ref 132–146)
SOURCE, BLOOD GAS: ABNORMAL
SOURCE, BLOOD GAS: ABNORMAL
THB: 14.6 G/DL (ref 11.5–16.5)
THB: 14.9 G/DL (ref 11.5–16.5)
TIME ANALYZED: 112
TIME ANALYZED: 411
TRIGL SERPL-MCNC: 101 MG/DL
VLDLC SERPL CALC-MCNC: 20 MG/DL
VT MECHANICAL: 600 ML
VT MECHANICAL: 700 ML
WBC OTHER # BLD: 8.8 K/UL (ref 4.5–11.5)

## 2025-03-11 PROCEDURE — 2060000000 HC ICU INTERMEDIATE R&B

## 2025-03-11 PROCEDURE — 6360000002 HC RX W HCPCS

## 2025-03-11 PROCEDURE — 85610 PROTHROMBIN TIME: CPT

## 2025-03-11 PROCEDURE — APPSS60 APP SPLIT SHARED TIME 46-60 MINUTES

## 2025-03-11 PROCEDURE — 6370000000 HC RX 637 (ALT 250 FOR IP): Performed by: STUDENT IN AN ORGANIZED HEALTH CARE EDUCATION/TRAINING PROGRAM

## 2025-03-11 PROCEDURE — C8929 TTE W OR WO FOL WCON,DOPPLER: HCPCS

## 2025-03-11 PROCEDURE — 6370000000 HC RX 637 (ALT 250 FOR IP)

## 2025-03-11 PROCEDURE — 6360000002 HC RX W HCPCS: Performed by: STUDENT IN AN ORGANIZED HEALTH CARE EDUCATION/TRAINING PROGRAM

## 2025-03-11 PROCEDURE — 94640 AIRWAY INHALATION TREATMENT: CPT

## 2025-03-11 PROCEDURE — 82962 GLUCOSE BLOOD TEST: CPT

## 2025-03-11 PROCEDURE — 2500000003 HC RX 250 WO HCPCS

## 2025-03-11 PROCEDURE — 82140 ASSAY OF AMMONIA: CPT

## 2025-03-11 PROCEDURE — 93306 TTE W/DOPPLER COMPLETE: CPT | Performed by: INTERNAL MEDICINE

## 2025-03-11 PROCEDURE — 82805 BLOOD GASES W/O2 SATURATION: CPT

## 2025-03-11 PROCEDURE — 2500000003 HC RX 250 WO HCPCS: Performed by: STUDENT IN AN ORGANIZED HEALTH CARE EDUCATION/TRAINING PROGRAM

## 2025-03-11 PROCEDURE — 2500000003 HC RX 250 WO HCPCS: Performed by: NURSE PRACTITIONER

## 2025-03-11 PROCEDURE — 99232 SBSQ HOSP IP/OBS MODERATE 35: CPT | Performed by: STUDENT IN AN ORGANIZED HEALTH CARE EDUCATION/TRAINING PROGRAM

## 2025-03-11 PROCEDURE — 94660 CPAP INITIATION&MGMT: CPT

## 2025-03-11 PROCEDURE — 80053 COMPREHEN METABOLIC PANEL: CPT

## 2025-03-11 PROCEDURE — 2700000000 HC OXYGEN THERAPY PER DAY

## 2025-03-11 PROCEDURE — 83036 HEMOGLOBIN GLYCOSYLATED A1C: CPT

## 2025-03-11 PROCEDURE — 83735 ASSAY OF MAGNESIUM: CPT

## 2025-03-11 PROCEDURE — 99223 1ST HOSP IP/OBS HIGH 75: CPT | Performed by: INTERNAL MEDICINE

## 2025-03-11 PROCEDURE — 80061 LIPID PANEL: CPT

## 2025-03-11 PROCEDURE — 85025 COMPLETE CBC W/AUTO DIFF WBC: CPT

## 2025-03-11 PROCEDURE — 82248 BILIRUBIN DIRECT: CPT

## 2025-03-11 RX ORDER — FLUMAZENIL 0.1 MG/ML
0.2 INJECTION INTRAVENOUS PRN
Status: DISCONTINUED | OUTPATIENT
Start: 2025-03-11 | End: 2025-03-15 | Stop reason: HOSPADM

## 2025-03-11 RX ORDER — FLUMAZENIL 0.1 MG/ML
0.2 INJECTION INTRAVENOUS ONCE
Status: COMPLETED | OUTPATIENT
Start: 2025-03-11 | End: 2025-03-11

## 2025-03-11 RX ORDER — WARFARIN SODIUM 4 MG/1
4 TABLET ORAL
Status: COMPLETED | OUTPATIENT
Start: 2025-03-11 | End: 2025-03-11

## 2025-03-11 RX ADMIN — GABAPENTIN 400 MG: 400 CAPSULE ORAL at 22:32

## 2025-03-11 RX ADMIN — ENOXAPARIN SODIUM 120 MG: 150 INJECTION SUBCUTANEOUS at 04:25

## 2025-03-11 RX ADMIN — IPRATROPIUM BROMIDE AND ALBUTEROL SULFATE 1 DOSE: 2.5; .5 SOLUTION RESPIRATORY (INHALATION) at 12:07

## 2025-03-11 RX ADMIN — FUROSEMIDE 40 MG: 10 INJECTION, SOLUTION INTRAMUSCULAR; INTRAVENOUS at 16:45

## 2025-03-11 RX ADMIN — METHYLPREDNISOLONE SODIUM SUCCINATE 40 MG: 40 INJECTION INTRAMUSCULAR; INTRAVENOUS at 04:25

## 2025-03-11 RX ADMIN — FLUMAZENIL 0.2 MG: 0.1 INJECTION, SOLUTION INTRAVENOUS at 01:35

## 2025-03-11 RX ADMIN — ARFORMOTEROL TARTRATE: 15 SOLUTION RESPIRATORY (INHALATION) at 07:49

## 2025-03-11 RX ADMIN — ENOXAPARIN SODIUM 120 MG: 150 INJECTION SUBCUTANEOUS at 16:46

## 2025-03-11 RX ADMIN — INSULIN LISPRO 2 UNITS: 100 INJECTION, SOLUTION INTRAVENOUS; SUBCUTANEOUS at 11:19

## 2025-03-11 RX ADMIN — IPRATROPIUM BROMIDE AND ALBUTEROL SULFATE 1 DOSE: 2.5; .5 SOLUTION RESPIRATORY (INHALATION) at 07:48

## 2025-03-11 RX ADMIN — WARFARIN SODIUM 4 MG: 4 TABLET ORAL at 17:02

## 2025-03-11 RX ADMIN — SODIUM CHLORIDE, PRESERVATIVE FREE 10 ML: 5 INJECTION INTRAVENOUS at 09:00

## 2025-03-11 RX ADMIN — IPRATROPIUM BROMIDE AND ALBUTEROL SULFATE 1 DOSE: 2.5; .5 SOLUTION RESPIRATORY (INHALATION) at 16:06

## 2025-03-11 RX ADMIN — ARFORMOTEROL TARTRATE: 15 SOLUTION RESPIRATORY (INHALATION) at 20:57

## 2025-03-11 RX ADMIN — METHYLPHENIDATE HYDROCHLORIDE 20 MG: 10 TABLET ORAL at 10:20

## 2025-03-11 RX ADMIN — CLOPIDOGREL BISULFATE 75 MG: 75 TABLET, FILM COATED ORAL at 10:20

## 2025-03-11 RX ADMIN — FUROSEMIDE 40 MG: 10 INJECTION, SOLUTION INTRAMUSCULAR; INTRAVENOUS at 10:20

## 2025-03-11 RX ADMIN — INSULIN LISPRO 1 UNITS: 100 INJECTION, SOLUTION INTRAVENOUS; SUBCUTANEOUS at 22:45

## 2025-03-11 RX ADMIN — SODIUM CHLORIDE, PRESERVATIVE FREE 10 ML: 5 INJECTION INTRAVENOUS at 22:32

## 2025-03-11 RX ADMIN — SERTRALINE 100 MG: 100 TABLET, FILM COATED ORAL at 22:31

## 2025-03-11 RX ADMIN — METHYLPHENIDATE HYDROCHLORIDE 20 MG: 10 TABLET ORAL at 22:31

## 2025-03-11 RX ADMIN — ATORVASTATIN CALCIUM 80 MG: 40 TABLET, FILM COATED ORAL at 10:20

## 2025-03-11 RX ADMIN — WATER 1000 MG: 1 INJECTION INTRAMUSCULAR; INTRAVENOUS; SUBCUTANEOUS at 16:45

## 2025-03-11 RX ADMIN — IPRATROPIUM BROMIDE AND ALBUTEROL SULFATE 1 DOSE: 2.5; .5 SOLUTION RESPIRATORY (INHALATION) at 20:57

## 2025-03-11 RX ADMIN — GABAPENTIN 400 MG: 400 CAPSULE ORAL at 13:15

## 2025-03-11 RX ADMIN — SERTRALINE 100 MG: 100 TABLET, FILM COATED ORAL at 10:20

## 2025-03-11 RX ADMIN — METHYLPREDNISOLONE SODIUM SUCCINATE 40 MG: 40 INJECTION INTRAMUSCULAR; INTRAVENOUS at 16:45

## 2025-03-11 ASSESSMENT — PAIN SCALES - GENERAL: PAINLEVEL_OUTOF10: 0

## 2025-03-11 NOTE — PROGRESS NOTES
Pharmacy Consultation Note  (Warfarin Dosing and Monitoring)    Initial consult date: 3/10/2025  Consulting Provider: Dr. Newton García    Taye Hollis is a 55 y.o. male for whom pharmacy has been asked to manage warfarin therapy.     Weight:   Wt Readings from Last 1 Encounters:   03/10/25 118.4 kg (261 lb)       TSH:    Lab Results   Component Value Date/Time    TSH 1.84 03/10/2025 12:13 PM       Hepatic Function Panel:                          Lab Results   Component Value Date/Time    ALKPHOS 111 03/11/2025 05:00 AM    ALT 17 03/11/2025 05:00 AM    AST 17 03/11/2025 05:00 AM    BILITOT 0.5 03/11/2025 05:00 AM    BILIDIR <0.2 03/11/2025 05:00 AM    IBILI Can not be calculated 03/11/2025 05:00 AM       Current significant warfarin drug-drug interactions include:   Ceftiaxone, methylprednisolone: may enhance anticoagulatory effect of warfarin    Recent Labs     03/10/25  1213 03/11/25  0515   HGB 12.8 11.7*    218     Date Warfarin Dose INR Heparin or LMWH Comment   3/10 2 mg (already taken this morning)  -----------  1.5 Enoxaparin 120 mg subQ twice daily    3/11 4 mg 1.5 Enoxaparin 120 mg subQ twice daily                           Assessment:  Patient is a 55 y.o. male on warfarin for Mechanical Heart Valve (mitral).  Patient's home warfarin dosing regimen is documented as warfarin 2 mg daily  Goal INR 2.5 - 3.5  INR 1.5 today  Currently ordered enoxaparin as bridge anticoagulation    Plan:  Will give warfarin 4 mg tonight  Continue enoxaparin until INR >2.5  Daily PT/INR until the INR is stable within the therapeutic range  Pharmacist will follow and monitor/adjust dosing as necessary    Alexey Nunez, PharmD, BCCCP  3/11/2025  12:07 PM    SEB: [phone]  SEY: [phone]  SJW: [phone]

## 2025-03-11 NOTE — CONSULTS
[facility]   Inpatient CHF Nurse Navigator Consult      Cardiologist: Dr. Medrano from [place] in [facility]    Taye Hollis is a 55 y.o. (1970) male with a history of HFpEF, most recent EF:  Lab Results   Component Value Date    LVEF 63 07/25/2023       Patient was awake and alert, laying in bed during the consultation and is agreeable to heart failure education. He was engaged and asked appropriate questions throughout the education session.     Barriers identified during consult contributing to HF Hospitalization:  [] Limited medication adherence   [] Poor health literacy, education regarding HF medications provided   [] Pill box provided to patient  [] Difficulty affording medications  [] Difficulty obtaining/ managing medications  [] Prescription assistance information given     [] Not weighing themselves daily  [x] Weight log provided for easy monitoring  [] Scale provided     [] Not following low sodium diet  [] Food insecurity   [x] 2 gram sodium diet education provided   [] Low sodium recipes provided  [] Sodium free seasoning provided   [] Low sodium meal delivery options given to patient  [] Dietician consulted     [] Lack of transportation to appointments     [] Depression, given chronic illness  [] Primary team notified     [] Goals of care need addressed  [] Palliative care consulted     [] CHF CHW consulted, to assist with         Chart Reviewed:  Diet: ADULT DIET; Regular; 3 carb choices (45 gm/meal); Low Sodium (2 gm)   Daily Weights: Patient Vitals for the past 96 hrs (Last 3 readings):   Weight   03/10/25 1141 118.4 kg (261 lb)     I/O:   Intake/Output Summary (Last 24 hours) at 3/11/2025 1338  Last data filed at 3/11/2025 0800  Gross per 24 hour   Intake 180 ml   Output 600 ml   Net -420 ml       [] Nursing staff/manager notified of inaccurate kennedy weights or I/O        Discharge Plan:  Above identified barriers reviewed and needs

## 2025-03-11 NOTE — ACP (ADVANCE CARE PLANNING)
Advance Care Planning   Healthcare Decision Maker:    Primary Decision Maker: JuanDarek - [facility] - [phone]    Click here to complete Healthcare Decision Makers including selection of the Healthcare Decision Maker Relationship (ie \"Primary\").

## 2025-03-11 NOTE — PROGRESS NOTES
03/11/25 0100   NIV Type   $NIV $Daily Charge   Equipment Type v60   Mode AVAPS  (found on AVAPS settings)   Mask Type Full face mask   Mask Size Large   Assessment   Respirations 25   SpO2 96 %   Comfort Level Good   Using Accessory Muscles No   Mask Compliance Good   Skin Assessment Clean, dry, & intact   Skin Protection for O2 Device Yes   Settings/Measurements   CPAP/EPAP 8 cmH2O   IPAP Min 16 cmH2O   IPAP Max 28 cmH2O   Vt (Set, mL) 600 mL   Vt (Measured) 628 mL   Rate Ordered 18   Insp Rise Time (%) 3 %   FiO2  40 %   I Time/ I Time % 0.8 s   Minute Volume (L/min) 15.5 Liters   Mask Leak (lpm) 81 lpm

## 2025-03-11 NOTE — CONSULTS
by mouth every morning    Tabatha Rivera MD   HYDROcodone-acetaminophen (NORCO) 7.5-325 MG per tablet Take 1 tablet by mouth every 6 hours as needed for Pain.    Tabatha Rivera MD   methylphenidate (RITALIN) 20 MG tablet Take 1 tablet by mouth 2 times daily.    Tabatha Rivera MD   sertraline (ZOLOFT) 100 MG tablet Take 1 tablet by mouth 2 times daily 1/18/17   Tabatha Rivera MD   losartan-hydrochlorothiazide (HYZAAR) 50-12.5 MG per tablet Take 1 tablet by mouth daily 1/26/17   Tabatha Rivera MD   metFORMIN (GLUCOPHAGE) 500 MG tablet Take 1 tablet by mouth 2 times daily (with meals)    Tabatha Rivera MD   tiotropium (SPIRIVA) 18 MCG inhalation capsule Inhale 1 capsule into the lungs daily    Tabatha Rivera MD   budesonide-formoterol (SYMBICORT) 160-4.5 MCG/ACT AERO Inhale 2 puffs into the lungs 2 times daily    Tabatha Rivera MD       Current Medications:    Current Facility-Administered Medications: flumazenil (ROMAZICON) injection 0.2 mg, 0.2 mg, IntraVENous, PRN  sodium chloride flush 0.9 % injection 5-40 mL, 5-40 mL, IntraVENous, 2 times per day  sodium chloride flush 0.9 % injection 5-40 mL, 5-40 mL, IntraVENous, PRN  0.9 % sodium chloride infusion, , IntraVENous, PRN  ondansetron (ZOFRAN-ODT) disintegrating tablet 4 mg, 4 mg, Oral, Q8H PRN **OR** ondansetron (ZOFRAN) injection 4 mg, 4 mg, IntraVENous, Q6H PRN  polyethylene glycol (GLYCOLAX) packet 17 g, 17 g, Oral, Daily PRN  potassium chloride (KLOR-CON M) extended release tablet 40 mEq, 40 mEq, Oral, PRN **OR** potassium bicarb-citric acid (EFFER-K) effervescent tablet 40 mEq, 40 mEq, Oral, PRN **OR** potassium chloride 10 mEq/100 mL IVPB (Peripheral Line), 10 mEq, IntraVENous, PRN  magnesium sulfate 2000 mg in 50 mL IVPB premix, 2,000 mg, IntraVENous, PRN  nicotine (NICODERM CQ) 21 MG/24HR 1 patch, 1 patch, TransDERmal, Daily  furosemide (LASIX) injection 40 mg, 40 mg, IntraVENous, BID  ipratropium 0.5  also given Narcan and Romazicon as he takes Norco and Valium daily.  CT head negative  Subtherapeutic INR.  Patient started on Lovenox  Patient reports frequent falls recently  CAD: MACRINA to RCA 2022.  [facility] 7/2023 with mild nonobstructive CAD and patent stent.  S/p mechanical On-X MVR 2014, maintained on Coumadin.  Redo sternotomy with MV thrombectomy 8/4/2023 [facility].  MV mean gradient 4 mmHg TTE 8/2023  Sick sinus syndrome s/p Abbott dual-chamber PPM 2015  Hypertension history.  Soft BP since admission  Hyperlipidemia on statin  Diabetes  COPD  Former smoker, currently uses snuff    Plan:  Echocardiogram  Interrogate Abbott PPM.  Continue diuresis with 40 mg IV twice daily.  Strict intake and output and daily weights.  Monitor renal function closely.  Continue Lipitor, Plavix.  Continue telemetry monitoring.  Monitor electrolytes: Keep potassium >4.0 magnesium >2.0.  Rest per primary service other consultants.  Case discussed with Dr Elder.  Further recommendations to follow as per above and per Dr Elder.      Electronically signed by JAMMIE Garcia - CNP on 3/11/2025 at 10:48 AM   ______________________________________________________________________  Cardiology attending attestation:  I have independently interviewed and examined the patient.  I personally reviewed pertinent laboratory values and diagnostic testing (including, if applicable, chest xray, electrocardiogram, telemetry, echocardiogram, stress testing, and coronary angiography).  I have reviewed the above documentation completed by JAMMIE Morales including past medical, social, and family history and agree with the findings, assessment and plan except where noted.  Plan formulated by me.  I participated in all aspects of the medical decision making and performed the substantive portion of the visit by contributing >50% of the total visit time.  Please see my additional contributions to the HPI, physical exam, and assessment / medical decision

## 2025-03-11 NOTE — PROGRESS NOTES
Critical lab called to NP. Within pt belongings, this RN found a bottle of Valium. NP placed orders at this time. Flumazenil effective. New orders placed at this time.

## 2025-03-11 NOTE — PROGRESS NOTES
[facility] Hospitalist Progress Note    Admitting Date and Time: 3/10/2025 11:27 AM  Admit Dx: Shortness of breath [R06.02]  Acute respiratory failure with hypoxia and hypercapnia (HCC) [J96.01, J96.02]  Acute on chronic respiratory failure with hypoxia and hypercapnia (HCC) [J96.21, J96.22]  Chronic obstructive pulmonary disease, unspecified COPD type (HCC) [J44.9]    Subjective:  Patient is being followed for Shortness of breath [R06.02]  Acute respiratory failure with hypoxia and hypercapnia (HCC) [J96.01, J96.02]  Acute on chronic respiratory failure with hypoxia and hypercapnia (HCC) [J96.21, J96.22]  Chronic obstructive pulmonary disease, unspecified COPD type (HCC) [J44.9]   Pt feels better, requesting to restart valium and pain meds.   Per RN: no major concern    ROS: denies fever, chills, cp, sob, n/v, HA unless stated above.      sodium chloride flush  5-40 mL IntraVENous 2 times per day    nicotine  1 patch TransDERmal Daily    furosemide  40 mg IntraVENous BID    ipratropium 0.5 mg-albuterol 2.5 mg  1 Dose Inhalation Q4H WA RT    methylPREDNISolone  40 mg IntraVENous Q12H    cefTRIAXone (ROCEPHIN) IV  1,000 mg IntraVENous Q24H    enoxaparin  1 mg/kg SubCUTAneous Q12H    warfarin placeholder: dosing by pharmacy   Oral RX Placeholder    atorvastatin  80 mg Oral Daily    arformoterol 15 mcg-budesonide 0.5 mg neb solution   Nebulization BID RT    clopidogrel  75 mg Oral Daily    methylphenidate  20 mg Oral BID    sertraline  100 mg Oral BID    [Held by provider] gabapentin  400 mg Oral TID    insulin lispro  0-4 Units SubCUTAneous 4 times per day     flumazenil, 0.2 mg, PRN  sodium chloride flush, 5-40 mL, PRN  sodium chloride, , PRN  ondansetron, 4 mg, Q8H PRN   Or  ondansetron, 4 mg, Q6H PRN  polyethylene glycol, 17 g, Daily PRN  potassium chloride, 40 mEq, PRN   Or  potassium alternative oral replacement, 40 mEq, PRN   Or  potassium chloride, 10 mEq, PRN  magnesium sulfate, 2,000 mg,  Continue home medications as able.  HLD-continue statin.  Tobacco use-cessation education. Nicotine patch ordered.   Right lower extremity wound-patient follows with podiatry.  Consult wound care.  AMS: suspect likely 2/2 hypercapnia and perhaps narcotic use as pt responded to Narcan in the ED. [place] D/C Summary notes that unauthorized Norco was found on the pt which was confiscated. Per nursing staff, we are not allowed to search the pt unless pt gives consent. Continue BiPAP as above. Narcan PRN ordered. TSH wnl. UDS+ for benzo's. Noted ammonia.Restarted gabapentin, holding valium & opioids.  INR subtherapeutic. Will initiate Lovenox BID with bridge to Warfarin. Pharmacy consult for Warfarin dosing.       Code Status: Full  DVT prophylaxis: lovenox      NOTE: This report was transcribed using voice recognition software. Every effort was made to ensure accuracy; however, inadvertent computerized transcription errors may be present.  Electronically signed by Colby Alvarez MD on 3/11/2025 at 8:20 AM

## 2025-03-11 NOTE — PLAN OF CARE
Problem: ABCDS Injury Assessment  Goal: Absence of physical injury  3/11/2025 0503 by Ray Perez RN  Outcome: Progressing     Problem: Discharge Planning  Goal: Discharge to home or other facility with appropriate resources  3/11/2025 0503 by Ray Perez RN  Outcome: Progressing     Problem: Chronic Conditions and Co-morbidities  Goal: Patient's chronic conditions and co-morbidity symptoms are monitored and maintained or improved  3/11/2025 0503 by Ray Perez RN  Outcome: Progressing     Problem: Pain  Goal: Verbalizes/displays adequate comfort level or baseline comfort level  3/11/2025 0503 by Ray Perez RN  Outcome: Progressing     Problem: Skin/Tissue Integrity  Goal: Skin integrity remains intact  Description: 1.  Monitor for areas of redness and/or skin breakdown  2.  Assess vascular access sites hourly  3.  Every 4-6 hours minimum:  Change oxygen saturation probe site  4.  Every 4-6 hours:  If on nasal continuous positive airway pressure, respiratory therapy assess nares and determine need for appliance change or resting period  Outcome: Progressing     Problem: Safety - Adult  Goal: Free from fall injury  Outcome: Progressing

## 2025-03-11 NOTE — CARE COORDINATION
Social Work/Discharge Planning:  Met with patient and completed initial assessment.  Explained Social Work role and discussed of care/discharge planning.  Patient states his mother passed away  and  is on Saturday.  He states his daughter, her significant other and grandson lives with him in a two story house.  PTA he is independent with no adaptive device.  He has a nebulizer at home.  He states he had oxygen in October from [employer], but they picked up equipment, since he didn't have a Primary Care Physician to sign orders.  He is currently requiring three liters oxygen and RN to wean as tolerated.  His Primary Care Physician is Dr. Otto Cardoza.  Patient plans to return home at discharge and denies any home care needs at this time.  Will continue to follow and assist with discharge planning. Electronically signed by ROBERTO De Jesus on 3/11/2025 at 3:39 PM

## 2025-03-11 NOTE — PLAN OF CARE
Patient's chart updated to reflect:      .    - HF care plan, HF education points and HF discharge instructions.  -Orders: 2 gram sodium diet, daily weights, I/O.  -PCP or cardiology follow up appointments to be scheduled within 7 days of hospital discharge.  -CHF education session will be provided to the patient prior to hospital discharge.    Bianca Silverio RN   Heart Failure Navigator

## 2025-03-11 NOTE — PROGRESS NOTES
Pt lethargic and only responsive to painful stimuli. Narcan given and unsuccessful. Dr García notified. Orders placed at this time.

## 2025-03-12 LAB
ANION GAP SERPL CALCULATED.3IONS-SCNC: 12 MMOL/L (ref 7–16)
APAP SERPL-MCNC: <5 UG/ML (ref 10–30)
BNP SERPL-MCNC: 792 PG/ML (ref 0–125)
BUN SERPL-MCNC: 27 MG/DL (ref 6–20)
CALCIUM SERPL-MCNC: 8.5 MG/DL (ref 8.6–10.2)
CHLORIDE SERPL-SCNC: 94 MMOL/L (ref 98–107)
CO2 SERPL-SCNC: 32 MMOL/L (ref 22–29)
CREAT SERPL-MCNC: 1.3 MG/DL (ref 0.7–1.2)
ETHANOLAMINE SERPL-MCNC: <10 MG/DL (ref 0–0.08)
GFR, ESTIMATED: 63 ML/MIN/1.73M2
GLUCOSE BLD-MCNC: 210 MG/DL (ref 74–99)
GLUCOSE BLD-MCNC: 270 MG/DL (ref 74–99)
GLUCOSE BLD-MCNC: 302 MG/DL (ref 74–99)
GLUCOSE BLD-MCNC: 433 MG/DL (ref 74–99)
GLUCOSE SERPL-MCNC: 297 MG/DL (ref 74–99)
INR PPP: 1.5
MAGNESIUM SERPL-MCNC: 2.3 MG/DL (ref 1.6–2.6)
POTASSIUM SERPL-SCNC: 4 MMOL/L (ref 3.5–5)
PROTHROMBIN TIME: 15.7 SEC (ref 9.3–12.4)
SALICYLATES SERPL-MCNC: <0.3 MG/DL (ref 0–30)
SODIUM SERPL-SCNC: 138 MMOL/L (ref 132–146)
TOXIC TRICYCLIC SC,BLOOD: NEGATIVE

## 2025-03-12 PROCEDURE — 99232 SBSQ HOSP IP/OBS MODERATE 35: CPT | Performed by: INTERNAL MEDICINE

## 2025-03-12 PROCEDURE — 83735 ASSAY OF MAGNESIUM: CPT

## 2025-03-12 PROCEDURE — 94640 AIRWAY INHALATION TREATMENT: CPT

## 2025-03-12 PROCEDURE — 80048 BASIC METABOLIC PNL TOTAL CA: CPT

## 2025-03-12 PROCEDURE — 6360000002 HC RX W HCPCS: Performed by: STUDENT IN AN ORGANIZED HEALTH CARE EDUCATION/TRAINING PROGRAM

## 2025-03-12 PROCEDURE — 6370000000 HC RX 637 (ALT 250 FOR IP): Performed by: STUDENT IN AN ORGANIZED HEALTH CARE EDUCATION/TRAINING PROGRAM

## 2025-03-12 PROCEDURE — 85610 PROTHROMBIN TIME: CPT

## 2025-03-12 PROCEDURE — 83880 ASSAY OF NATRIURETIC PEPTIDE: CPT

## 2025-03-12 PROCEDURE — 2500000003 HC RX 250 WO HCPCS

## 2025-03-12 PROCEDURE — 6370000000 HC RX 637 (ALT 250 FOR IP)

## 2025-03-12 PROCEDURE — 94660 CPAP INITIATION&MGMT: CPT

## 2025-03-12 PROCEDURE — 2500000003 HC RX 250 WO HCPCS: Performed by: STUDENT IN AN ORGANIZED HEALTH CARE EDUCATION/TRAINING PROGRAM

## 2025-03-12 PROCEDURE — 2060000000 HC ICU INTERMEDIATE R&B

## 2025-03-12 PROCEDURE — 99233 SBSQ HOSP IP/OBS HIGH 50: CPT | Performed by: INTERNAL MEDICINE

## 2025-03-12 PROCEDURE — 2700000000 HC OXYGEN THERAPY PER DAY

## 2025-03-12 PROCEDURE — 82962 GLUCOSE BLOOD TEST: CPT

## 2025-03-12 PROCEDURE — 6360000002 HC RX W HCPCS

## 2025-03-12 RX ORDER — WARFARIN SODIUM 4 MG/1
4 TABLET ORAL
Status: COMPLETED | OUTPATIENT
Start: 2025-03-12 | End: 2025-03-12

## 2025-03-12 RX ORDER — INSULIN LISPRO 100 [IU]/ML
4 INJECTION, SOLUTION INTRAVENOUS; SUBCUTANEOUS ONCE
Status: COMPLETED | OUTPATIENT
Start: 2025-03-13 | End: 2025-03-13

## 2025-03-12 RX ADMIN — IPRATROPIUM BROMIDE AND ALBUTEROL SULFATE 1 DOSE: 2.5; .5 SOLUTION RESPIRATORY (INHALATION) at 07:48

## 2025-03-12 RX ADMIN — METHYLPREDNISOLONE SODIUM SUCCINATE 40 MG: 40 INJECTION INTRAMUSCULAR; INTRAVENOUS at 16:01

## 2025-03-12 RX ADMIN — ENOXAPARIN SODIUM 120 MG: 150 INJECTION SUBCUTANEOUS at 18:00

## 2025-03-12 RX ADMIN — SERTRALINE 100 MG: 100 TABLET, FILM COATED ORAL at 20:42

## 2025-03-12 RX ADMIN — INSULIN LISPRO 2 UNITS: 100 INJECTION, SOLUTION INTRAVENOUS; SUBCUTANEOUS at 11:30

## 2025-03-12 RX ADMIN — SERTRALINE 100 MG: 100 TABLET, FILM COATED ORAL at 08:56

## 2025-03-12 RX ADMIN — ARFORMOTEROL TARTRATE: 15 SOLUTION RESPIRATORY (INHALATION) at 07:48

## 2025-03-12 RX ADMIN — IPRATROPIUM BROMIDE AND ALBUTEROL SULFATE 1 DOSE: 2.5; .5 SOLUTION RESPIRATORY (INHALATION) at 12:09

## 2025-03-12 RX ADMIN — SODIUM CHLORIDE, PRESERVATIVE FREE 10 ML: 5 INJECTION INTRAVENOUS at 08:56

## 2025-03-12 RX ADMIN — ENOXAPARIN SODIUM 120 MG: 150 INJECTION SUBCUTANEOUS at 05:31

## 2025-03-12 RX ADMIN — SODIUM CHLORIDE, PRESERVATIVE FREE 10 ML: 5 INJECTION INTRAVENOUS at 20:42

## 2025-03-12 RX ADMIN — ARFORMOTEROL TARTRATE: 15 SOLUTION RESPIRATORY (INHALATION) at 20:06

## 2025-03-12 RX ADMIN — CLOPIDOGREL BISULFATE 75 MG: 75 TABLET, FILM COATED ORAL at 08:55

## 2025-03-12 RX ADMIN — METHYLPHENIDATE HYDROCHLORIDE 20 MG: 10 TABLET ORAL at 20:42

## 2025-03-12 RX ADMIN — ATORVASTATIN CALCIUM 80 MG: 40 TABLET, FILM COATED ORAL at 08:56

## 2025-03-12 RX ADMIN — IPRATROPIUM BROMIDE AND ALBUTEROL SULFATE 1 DOSE: 2.5; .5 SOLUTION RESPIRATORY (INHALATION) at 20:06

## 2025-03-12 RX ADMIN — METHYLPREDNISOLONE SODIUM SUCCINATE 40 MG: 40 INJECTION INTRAMUSCULAR; INTRAVENOUS at 05:32

## 2025-03-12 RX ADMIN — WATER 1000 MG: 1 INJECTION INTRAMUSCULAR; INTRAVENOUS; SUBCUTANEOUS at 17:00

## 2025-03-12 RX ADMIN — INSULIN LISPRO 3 UNITS: 100 INJECTION, SOLUTION INTRAVENOUS; SUBCUTANEOUS at 16:03

## 2025-03-12 RX ADMIN — METHYLPHENIDATE HYDROCHLORIDE 20 MG: 10 TABLET ORAL at 08:55

## 2025-03-12 RX ADMIN — GABAPENTIN 400 MG: 400 CAPSULE ORAL at 20:42

## 2025-03-12 RX ADMIN — INSULIN LISPRO 4 UNITS: 100 INJECTION, SOLUTION INTRAVENOUS; SUBCUTANEOUS at 23:56

## 2025-03-12 RX ADMIN — GABAPENTIN 400 MG: 400 CAPSULE ORAL at 15:59

## 2025-03-12 RX ADMIN — GABAPENTIN 400 MG: 400 CAPSULE ORAL at 08:56

## 2025-03-12 RX ADMIN — IPRATROPIUM BROMIDE AND ALBUTEROL SULFATE 1 DOSE: 2.5; .5 SOLUTION RESPIRATORY (INHALATION) at 15:56

## 2025-03-12 RX ADMIN — INSULIN LISPRO 1 UNITS: 100 INJECTION, SOLUTION INTRAVENOUS; SUBCUTANEOUS at 05:33

## 2025-03-12 RX ADMIN — FUROSEMIDE 40 MG: 10 INJECTION, SOLUTION INTRAMUSCULAR; INTRAVENOUS at 08:56

## 2025-03-12 RX ADMIN — WARFARIN SODIUM 4 MG: 4 TABLET ORAL at 18:00

## 2025-03-12 RX ADMIN — FUROSEMIDE 40 MG: 10 INJECTION, SOLUTION INTRAMUSCULAR; INTRAVENOUS at 19:10

## 2025-03-12 ASSESSMENT — PAIN SCALES - GENERAL: PAINLEVEL_OUTOF10: 0

## 2025-03-12 NOTE — PROGRESS NOTES
[facility] Hospitalist Progress Note    Admitting Date and Time: 3/10/2025 11:27 AM  Admit Dx: Shortness of breath [R06.02]  Acute respiratory failure with hypoxia and hypercapnia (HCC) [J96.01, J96.02]  Acute on chronic respiratory failure with hypoxia and hypercapnia (HCC) [J96.21, J96.22]  Chronic obstructive pulmonary disease, unspecified COPD type (HCC) [J44.9]    Subjective:  Patient is being followed for Shortness of breath [R06.02]  Acute respiratory failure with hypoxia and hypercapnia (HCC) [J96.01, J96.02]  Acute on chronic respiratory failure with hypoxia and hypercapnia (HCC) [J96.21, J96.22]  Chronic obstructive pulmonary disease, unspecified COPD type (HCC) [J44.9]   Pt feels better, requesting to restart valium and pain meds.   Per RN: no major concern  Desaturated to the low 80's with minimal exertion   ROS: denies fever, chills, cp, sob, n/v, HA unless stated above.      sodium chloride flush  5-40 mL IntraVENous 2 times per day    nicotine  1 patch TransDERmal Daily    furosemide  40 mg IntraVENous BID    ipratropium 0.5 mg-albuterol 2.5 mg  1 Dose Inhalation Q4H WA RT    methylPREDNISolone  40 mg IntraVENous Q12H    cefTRIAXone (ROCEPHIN) IV  1,000 mg IntraVENous Q24H    enoxaparin  1 mg/kg SubCUTAneous Q12H    warfarin placeholder: dosing by pharmacy   Oral RX Placeholder    atorvastatin  80 mg Oral Daily    arformoterol 15 mcg-budesonide 0.5 mg neb solution   Nebulization BID RT    clopidogrel  75 mg Oral Daily    methylphenidate  20 mg Oral BID    sertraline  100 mg Oral BID    gabapentin  400 mg Oral TID    insulin lispro  0-4 Units SubCUTAneous 4 times per day     sulfur hexafluoride microspheres, 2 mL, ONCE PRN  flumazenil, 0.2 mg, PRN  sodium chloride flush, 5-40 mL, PRN  sodium chloride, , PRN  ondansetron, 4 mg, Q8H PRN   Or  ondansetron, 4 mg, Q6H PRN  polyethylene glycol, 17 g, Daily PRN  potassium chloride, 40 mEq, PRN   Or  potassium alternative oral replacement, 40 mEq,  arm/forearm negative for acute fracture/dislocation. PT/OT to eval.   Coronary artery disease-s/p PCI 2022.  Continue warfarin.  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  Continue Lovenox twice daily.  Pacemaker-interrogate.  Monitor on telemetry.  DM type II-hold metformin.  BG ACHS, SSI, hypoglycemic protocol.  A1c ordered.  HTN-stable.  Continue home medications as able.  HLD-continue statin.  Tobacco use-cessation education. Nicotine patch ordered.   Right lower extremity wound-patient follows with podiatry.  Consult wound care.  AMS: suspect likely 2/2 hypercapnia and perhaps narcotic use as pt responded to Narcan in the ED. [place] D/C Summary notes that unauthorized Norco was found on the pt which was confiscated. Per nursing staff, we are not allowed to search the pt unless pt gives consent. Continue BiPAP as above. Narcan PRN ordered. TSH wnl. UDS+ for benzo's. Noted ammonia.Restarted gabapentin, holding valium & opioids.  INR subtherapeutic. Will initiate Lovenox BID with bridge to Warfarin. Pharmacy consult for Warfarin dosing.       Code Status: Full  DVT prophylaxis: lovenox      NOTE: This report was transcribed using voice recognition software. Every effort was made to ensure accuracy; however, inadvertent computerized transcription errors may be present.  Electronically signed by Emelina Prescott MD on 3/12/2025 at 9:35 AM

## 2025-03-12 NOTE — PROGRESS NOTES
Pulse ox was 94% on room air at rest.   Ambulated patient on room air.   Oxygen saturation was 84% on room air while ambulating.   Oxygen applied.   Recovery pulse ox was 93% on 2 liters of oxygen while ambulating.

## 2025-03-12 NOTE — CARE COORDINATION
Social Work/Discharge Planning:  Received notification patient will need an INR device for home.  Referral made to Elissa with [employer] and Physician signed INR form.  [facility] will need [facility].  He is currently requiring two liters oxygen.  Met with patient and confirmed he will use [employer] if oxygen is needed.  Plan is home when medically stable. Will continue to follow.  Electronically signed by ROBERTO De Jesus on 3/12/2025 at 10:47 AM

## 2025-03-12 NOTE — PROGRESS NOTES
INPATIENT CARDIOLOGY FOLLOW-UP    Name: Taye Hollis    Age: 55 y.o.    Date of Admission: 3/10/2025 11:27 AM    Date of Service: 3/12/2025    Primary Cardiologist: Neto    Chief Complaint: Follow-up for \"CHF\"    Interim History:  Denies chest pain breathing better.    Echo showed high mitral valve gradients but valve not well-visualized.    Review of Systems:   Negative except as described above    Problem List:  Patient Active Problem List   Diagnosis    Chronic osteomyelitis of right tibia (HCC)    H/O mitral valve replacement with mechanical valve    Acute on chronic respiratory failure with hypoxia and hypercapnia (HCC)    COPD exacerbation (HCC)    CHF exacerbation (HCC)    Somnolence    Acute respiratory failure with hypoxia and hypercapnia (HCC)    Acute on chronic diastolic heart failure (HCC)    Subtherapeutic international normalized ratio (INR)    Warfarin anticoagulation    Coronary artery disease involving native coronary artery of native heart without angina pectoris    Normally functioning cardiac pacemaker present       Current Medications:    Current Facility-Administered Medications:     warfarin (COUMADIN) tablet 4 mg, 4 mg, Oral, Once, Newton García MD    sulfur hexafluoride microspheres (LUMASON) 60.7-25 MG injection 2 mL, 2 mL, IntraVENous, ONCE PRN, Hina Gamez APRN - CNP    flumazenil (ROMAZICON) injection 0.2 mg, 0.2 mg, IntraVENous, PRN, Mirian Jauregui APRN - CNP    sodium chloride flush 0.9 % injection 5-40 mL, 5-40 mL, IntraVENous, 2 times per day, Hina Gamez APRN - CNP, 10 mL at 03/12/25 0856    sodium chloride flush 0.9 % injection 5-40 mL, 5-40 mL, IntraVENous, PRN, Hina Gamez APRN - CNP    0.9 % sodium chloride infusion, , IntraVENous, PRN, Hina Gamez APRN - CNP    ondansetron (ZOFRAN-ODT) disintegrating tablet 4 mg, 4 mg, Oral, Q8H PRN **OR** ondansetron (ZOFRAN) injection 4 mg, 4 mg, IntraVENous, Q6H PRN, Hina Gmaez  software. Every effort was made to ensure accuracy; however, inadvertent computerized transcription errors may be present.

## 2025-03-12 NOTE — PROGRESS NOTES
Pharmacy Consultation Note  (Warfarin Dosing and Monitoring)    Initial consult date: 3/10/2025  Consulting Provider: Dr. Newton García    Taye Hollis is a 55 y.o. male for whom pharmacy has been asked to manage warfarin therapy.     Weight:   Wt Readings from Last 1 Encounters:   03/12/25 119.5 kg (263 lb 7.2 oz)       TSH:    Lab Results   Component Value Date/Time    TSH 1.84 03/10/2025 12:13 PM       Hepatic Function Panel:                          Lab Results   Component Value Date/Time    ALKPHOS 111 03/11/2025 05:00 AM    ALT 17 03/11/2025 05:00 AM    AST 17 03/11/2025 05:00 AM    BILITOT 0.5 03/11/2025 05:00 AM    BILIDIR <0.2 03/11/2025 05:00 AM    IBILI Can not be calculated 03/11/2025 05:00 AM       Current significant warfarin drug-drug interactions include:   Ceftiaxone, methylprednisolone: may enhance anticoagulatory effect of warfarin    Recent Labs     03/10/25  1213 03/11/25  0515   HGB 12.8 11.7*    218     Date Warfarin Dose INR Heparin or LMWH Comment   3/10 2 mg (already taken this morning)  -----------  1.5 Enoxaparin 120 mg subQ twice daily    3/11 4 mg 1.5 Enoxaparin 120 mg subQ twice daily    3/12 4 mg 1.5 Enoxaparin 120 mg subQ twice daily                    Assessment:  Patient is a 55 y.o. male on warfarin for Mechanical Heart Valve (mitral).  Patient's home warfarin dosing regimen is documented as warfarin 2 mg daily  Goal INR 2.5 - 3.5  INR 1.5 today  Currently ordered enoxaparin as bridge anticoagulation    Plan:  Will give warfarin 4 mg tonight  Continue enoxaparin until INR >2.5  Daily PT/INR until the INR is stable within the therapeutic range  Pharmacist will follow and monitor/adjust dosing as necessary    Alexey Nunez, PharmD, BCCCP  3/12/2025  10:50 AM    SEB: [phone]  SEY: [phone]  SJW: [phone]

## 2025-03-12 NOTE — PLAN OF CARE
Problem: ABCDS Injury Assessment  Goal: Absence of physical injury  Outcome: Progressing     Problem: Discharge Planning  Goal: Discharge to home or other facility with appropriate resources  Outcome: Progressing     Problem: Chronic Conditions and Co-morbidities  Goal: Patient's chronic conditions and co-morbidity symptoms are monitored and maintained or improved  Outcome: Progressing     Problem: Pain  Goal: Verbalizes/displays adequate comfort level or baseline comfort level  Outcome: Progressing     Problem: Skin/Tissue Integrity  Goal: Skin integrity remains intact  Description: 1.  Monitor for areas of redness and/or skin breakdown  2.  Assess vascular access sites hourly  3.  Every 4-6 hours minimum:  Change oxygen saturation probe site  4.  Every 4-6 hours:  If on nasal continuous positive airway pressure, respiratory therapy assess nares and determine need for appliance change or resting period  Outcome: Progressing     Problem: Safety - Adult  Goal: Free from fall injury  Outcome: Progressing

## 2025-03-13 ENCOUNTER — ANESTHESIA EVENT (OUTPATIENT)
Age: 55
End: 2025-03-13
Payer: MEDICARE

## 2025-03-13 LAB
ANION GAP SERPL CALCULATED.3IONS-SCNC: 10 MMOL/L (ref 7–16)
BUN SERPL-MCNC: 29 MG/DL (ref 6–20)
CALCIUM SERPL-MCNC: 8.3 MG/DL (ref 8.6–10.2)
CHLORIDE SERPL-SCNC: 93 MMOL/L (ref 98–107)
CO2 SERPL-SCNC: 33 MMOL/L (ref 22–29)
CREAT SERPL-MCNC: 1.3 MG/DL (ref 0.7–1.2)
GFR, ESTIMATED: 65 ML/MIN/1.73M2
GLUCOSE BLD-MCNC: 321 MG/DL (ref 74–99)
GLUCOSE BLD-MCNC: 324 MG/DL (ref 74–99)
GLUCOSE BLD-MCNC: 351 MG/DL (ref 74–99)
GLUCOSE BLD-MCNC: 385 MG/DL (ref 74–99)
GLUCOSE SERPL-MCNC: 394 MG/DL (ref 74–99)
INR PPP: 1.4
MAGNESIUM SERPL-MCNC: 2.3 MG/DL (ref 1.6–2.6)
POTASSIUM SERPL-SCNC: 4 MMOL/L (ref 3.5–5)
PROTHROMBIN TIME: 14.4 SEC (ref 9.3–12.4)
SODIUM SERPL-SCNC: 136 MMOL/L (ref 132–146)

## 2025-03-13 PROCEDURE — 94660 CPAP INITIATION&MGMT: CPT

## 2025-03-13 PROCEDURE — 6360000002 HC RX W HCPCS: Performed by: STUDENT IN AN ORGANIZED HEALTH CARE EDUCATION/TRAINING PROGRAM

## 2025-03-13 PROCEDURE — 2500000003 HC RX 250 WO HCPCS

## 2025-03-13 PROCEDURE — 94640 AIRWAY INHALATION TREATMENT: CPT

## 2025-03-13 PROCEDURE — 82962 GLUCOSE BLOOD TEST: CPT

## 2025-03-13 PROCEDURE — 6370000000 HC RX 637 (ALT 250 FOR IP): Performed by: STUDENT IN AN ORGANIZED HEALTH CARE EDUCATION/TRAINING PROGRAM

## 2025-03-13 PROCEDURE — 2700000000 HC OXYGEN THERAPY PER DAY

## 2025-03-13 PROCEDURE — 2500000003 HC RX 250 WO HCPCS: Performed by: STUDENT IN AN ORGANIZED HEALTH CARE EDUCATION/TRAINING PROGRAM

## 2025-03-13 PROCEDURE — 6370000000 HC RX 637 (ALT 250 FOR IP): Performed by: NURSE PRACTITIONER

## 2025-03-13 PROCEDURE — 83735 ASSAY OF MAGNESIUM: CPT

## 2025-03-13 PROCEDURE — 2060000000 HC ICU INTERMEDIATE R&B

## 2025-03-13 PROCEDURE — 85610 PROTHROMBIN TIME: CPT

## 2025-03-13 PROCEDURE — 80048 BASIC METABOLIC PNL TOTAL CA: CPT

## 2025-03-13 PROCEDURE — 6370000000 HC RX 637 (ALT 250 FOR IP)

## 2025-03-13 PROCEDURE — 99232 SBSQ HOSP IP/OBS MODERATE 35: CPT | Performed by: STUDENT IN AN ORGANIZED HEALTH CARE EDUCATION/TRAINING PROGRAM

## 2025-03-13 PROCEDURE — 99233 SBSQ HOSP IP/OBS HIGH 50: CPT | Performed by: INTERNAL MEDICINE

## 2025-03-13 PROCEDURE — 6360000002 HC RX W HCPCS

## 2025-03-13 RX ORDER — INSULIN LISPRO 100 [IU]/ML
0-16 INJECTION, SOLUTION INTRAVENOUS; SUBCUTANEOUS
Status: DISCONTINUED | OUTPATIENT
Start: 2025-03-13 | End: 2025-03-15 | Stop reason: HOSPADM

## 2025-03-13 RX ORDER — DEXTROSE MONOHYDRATE 100 MG/ML
INJECTION, SOLUTION INTRAVENOUS CONTINUOUS PRN
Status: DISCONTINUED | OUTPATIENT
Start: 2025-03-13 | End: 2025-03-13 | Stop reason: SDUPTHER

## 2025-03-13 RX ORDER — GLUCAGON 1 MG/ML
1 KIT INJECTION PRN
Status: DISCONTINUED | OUTPATIENT
Start: 2025-03-13 | End: 2025-03-13 | Stop reason: SDUPTHER

## 2025-03-13 RX ORDER — INSULIN GLARGINE 100 [IU]/ML
15 INJECTION, SOLUTION SUBCUTANEOUS NIGHTLY
Status: DISCONTINUED | OUTPATIENT
Start: 2025-03-13 | End: 2025-03-15 | Stop reason: HOSPADM

## 2025-03-13 RX ORDER — METHYLPREDNISOLONE SODIUM SUCCINATE 40 MG/ML
40 INJECTION INTRAMUSCULAR; INTRAVENOUS DAILY
Status: DISCONTINUED | OUTPATIENT
Start: 2025-03-14 | End: 2025-03-15 | Stop reason: HOSPADM

## 2025-03-13 RX ORDER — WARFARIN SODIUM 5 MG/1
5 TABLET ORAL
Status: COMPLETED | OUTPATIENT
Start: 2025-03-13 | End: 2025-03-13

## 2025-03-13 RX ADMIN — INSULIN LISPRO 16 UNITS: 100 INJECTION, SOLUTION INTRAVENOUS; SUBCUTANEOUS at 11:12

## 2025-03-13 RX ADMIN — INSULIN LISPRO 4 UNITS: 100 INJECTION, SOLUTION INTRAVENOUS; SUBCUTANEOUS at 05:17

## 2025-03-13 RX ADMIN — ENOXAPARIN SODIUM 120 MG: 150 INJECTION SUBCUTANEOUS at 16:10

## 2025-03-13 RX ADMIN — IPRATROPIUM BROMIDE AND ALBUTEROL SULFATE 1 DOSE: 2.5; .5 SOLUTION RESPIRATORY (INHALATION) at 12:29

## 2025-03-13 RX ADMIN — IPRATROPIUM BROMIDE AND ALBUTEROL SULFATE 1 DOSE: 2.5; .5 SOLUTION RESPIRATORY (INHALATION) at 15:50

## 2025-03-13 RX ADMIN — ARFORMOTEROL TARTRATE: 15 SOLUTION RESPIRATORY (INHALATION) at 08:46

## 2025-03-13 RX ADMIN — WARFARIN SODIUM 5 MG: 5 TABLET ORAL at 16:10

## 2025-03-13 RX ADMIN — INSULIN LISPRO 4 UNITS: 100 INJECTION, SOLUTION INTRAVENOUS; SUBCUTANEOUS at 00:05

## 2025-03-13 RX ADMIN — IPRATROPIUM BROMIDE AND ALBUTEROL SULFATE 1 DOSE: 2.5; .5 SOLUTION RESPIRATORY (INHALATION) at 08:46

## 2025-03-13 RX ADMIN — GABAPENTIN 400 MG: 400 CAPSULE ORAL at 21:15

## 2025-03-13 RX ADMIN — SERTRALINE 100 MG: 100 TABLET, FILM COATED ORAL at 08:01

## 2025-03-13 RX ADMIN — CLOPIDOGREL BISULFATE 75 MG: 75 TABLET, FILM COATED ORAL at 08:01

## 2025-03-13 RX ADMIN — INSULIN LISPRO 12 UNITS: 100 INJECTION, SOLUTION INTRAVENOUS; SUBCUTANEOUS at 16:10

## 2025-03-13 RX ADMIN — GABAPENTIN 400 MG: 400 CAPSULE ORAL at 08:01

## 2025-03-13 RX ADMIN — ENOXAPARIN SODIUM 120 MG: 150 INJECTION SUBCUTANEOUS at 05:17

## 2025-03-13 RX ADMIN — FUROSEMIDE 40 MG: 10 INJECTION, SOLUTION INTRAMUSCULAR; INTRAVENOUS at 08:01

## 2025-03-13 RX ADMIN — METHYLPHENIDATE HYDROCHLORIDE 20 MG: 10 TABLET ORAL at 21:15

## 2025-03-13 RX ADMIN — GABAPENTIN 400 MG: 400 CAPSULE ORAL at 13:24

## 2025-03-13 RX ADMIN — SERTRALINE 100 MG: 100 TABLET, FILM COATED ORAL at 21:15

## 2025-03-13 RX ADMIN — INSULIN LISPRO 12 UNITS: 100 INJECTION, SOLUTION INTRAVENOUS; SUBCUTANEOUS at 21:17

## 2025-03-13 RX ADMIN — SODIUM CHLORIDE, PRESERVATIVE FREE 10 ML: 5 INJECTION INTRAVENOUS at 21:15

## 2025-03-13 RX ADMIN — ARFORMOTEROL TARTRATE: 15 SOLUTION RESPIRATORY (INHALATION) at 21:09

## 2025-03-13 RX ADMIN — FUROSEMIDE 40 MG: 10 INJECTION, SOLUTION INTRAMUSCULAR; INTRAVENOUS at 16:10

## 2025-03-13 RX ADMIN — METHYLPHENIDATE HYDROCHLORIDE 20 MG: 10 TABLET ORAL at 08:01

## 2025-03-13 RX ADMIN — INSULIN GLARGINE 15 UNITS: 100 INJECTION, SOLUTION SUBCUTANEOUS at 21:20

## 2025-03-13 RX ADMIN — WATER 1000 MG: 1 INJECTION INTRAMUSCULAR; INTRAVENOUS; SUBCUTANEOUS at 16:10

## 2025-03-13 RX ADMIN — SODIUM CHLORIDE, PRESERVATIVE FREE 10 ML: 5 INJECTION INTRAVENOUS at 08:01

## 2025-03-13 RX ADMIN — METHYLPREDNISOLONE SODIUM SUCCINATE 40 MG: 40 INJECTION INTRAMUSCULAR; INTRAVENOUS at 05:16

## 2025-03-13 RX ADMIN — IPRATROPIUM BROMIDE AND ALBUTEROL SULFATE 1 DOSE: 2.5; .5 SOLUTION RESPIRATORY (INHALATION) at 21:09

## 2025-03-13 RX ADMIN — ATORVASTATIN CALCIUM 80 MG: 40 TABLET, FILM COATED ORAL at 08:01

## 2025-03-13 ASSESSMENT — PAIN SCALES - GENERAL: PAINLEVEL_OUTOF10: 0

## 2025-03-13 ASSESSMENT — LIFESTYLE VARIABLES: SMOKING_STATUS: 0

## 2025-03-13 ASSESSMENT — COPD QUESTIONNAIRES: CAT_SEVERITY: NO INTERVAL CHANGE

## 2025-03-13 NOTE — PROGRESS NOTES
Pharmacy Consultation Note  (Warfarin Dosing and Monitoring)    Initial consult date: 3/10/2025  Consulting Provider: Dr. Newton García    Taye Hollis is a 55 y.o. male for whom pharmacy has been asked to manage warfarin therapy.     Weight:   Wt Readings from Last 1 Encounters:   03/13/25 119.6 kg (263 lb 10.7 oz)       TSH:    Lab Results   Component Value Date/Time    TSH 1.84 03/10/2025 12:13 PM       Hepatic Function Panel:                          Lab Results   Component Value Date/Time    ALKPHOS 111 03/11/2025 05:00 AM    ALT 17 03/11/2025 05:00 AM    AST 17 03/11/2025 05:00 AM    BILITOT 0.5 03/11/2025 05:00 AM    BILIDIR <0.2 03/11/2025 05:00 AM    IBILI Can not be calculated 03/11/2025 05:00 AM       Current significant warfarin drug-drug interactions include:   Ceftiaxone, methylprednisolone: may enhance anticoagulatory effect of warfarin    Recent Labs     03/10/25  1213 03/11/25  0515   HGB 12.8 11.7*    218     Date Warfarin Dose INR Heparin or LMWH Comment   3/10 2 mg (already taken this morning)  -----------  1.5 Enoxaparin 120 mg subQ twice daily    3/11 4 mg 1.5 Enoxaparin 120 mg subQ twice daily    3/12 4 mg 1.5 Enoxaparin 120 mg subQ twice daily    3/13 5 mg 1.4 Enoxaparin 120 mg subQ twice daily                                  Assessment:  Patient is a 55 y.o. male on warfarin for Mechanical Heart Valve (mitral).  Patient's home warfarin dosing regimen is documented as warfarin 2 mg daily  Goal INR 2.5 - 3.5  INR 1.4 today  Currently ordered enoxaparin as bridge anticoagulation    Plan:  Will give warfarin 5 mg tonight  Continue enoxaparin until INR >2.5  Daily PT/INR until the INR is stable within the therapeutic range  Pharmacist will follow and monitor/adjust dosing as necessary    Alexey Nunez, PharmD, BCCCP  3/13/2025  9:54 AM    LISA: [phone]  SEY: [phone]  SJW: [phone]

## 2025-03-13 NOTE — PROGRESS NOTES
BID RT    clopidogrel  75 mg Oral Daily    methylphenidate  20 mg Oral BID    sertraline  100 mg Oral BID    gabapentin  400 mg Oral TID     PRN Meds: glucose, dextrose bolus **OR** dextrose bolus, glucagon (rDNA), dextrose, sulfur hexafluoride microspheres, flumazenil, sodium chloride flush, sodium chloride, ondansetron **OR** ondansetron, polyethylene glycol, potassium chloride **OR** potassium alternative oral replacement **OR** potassium chloride, magnesium sulfate, ipratropium 0.5 mg-albuterol 2.5 mg, glucose, dextrose bolus **OR** dextrose bolus, glucagon (rDNA), dextrose, glucose, dextrose bolus **OR** dextrose bolus, dextrose, naloxone, [Held by provider] diazePAM, [Held by provider] HYDROcodone-acetaminophen, albuterol    Labs:     Recent Labs     03/10/25  1213 03/11/25  0515   WBC 10.2 8.8   HGB 12.8 11.7*   HCT 41.4 37.5    218       Recent Labs     03/11/25  0500 03/12/25  0158 03/13/25  0143    138 136   K 4.0 4.0 4.0   CL 99 94* 93*   CO2 35* 32* 33*   BUN 19 27* 29*   CREATININE 1.1 1.3* 1.3*   CALCIUM 8.7 8.5* 8.3*       Recent Labs     03/10/25  1213 03/11/25  0500   ALKPHOS 106 111   ALT 19 17   AST 18 17   BILITOT 0.4 0.5   LIPASE 33  --        Recent Labs     03/11/25  1100 03/12/25  0158 03/13/25  0143   INR 1.5 1.5 1.4       No results for input(s): \"CKTOTAL\", \"TROPONINI\" in the last 72 hours.    Chronic labs:    Lab Results   Component Value Date    CHOL 136 03/11/2025    TRIG 101 03/11/2025    HDL 45 03/11/2025    TSH 1.84 03/10/2025    INR 1.4 03/13/2025    LABA1C 6.3 (H) 03/11/2025       Radiology: REVIEWED DAILY    +++++++++++++++++++++++++++++++++++++++++++++++++  Vu Thomas MD   Hospitalist  [place], [place]  +++++++++++++++++++++++++++++++++++++++++++++++++  NOTE: This report was transcribed using voice recognition software. Every effort was made to ensure accuracy; however, inadvertent computerized transcription errors may be  present.

## 2025-03-13 NOTE — CARE COORDINATION
Social Work/Discharge Planning:  Chart reviewed.  Patient on room air.  He will have a VANESSA tomorrow.  Plan is home at discharge.  Will continue to follow.  Electronically signed by ROBERTO De Jesus on 3/13/2025 at 11:29 AM

## 2025-03-13 NOTE — PROGRESS NOTES
Keisha with the EKG/cardiology office notified this worker that Mr Bunny's VANESSA will be Friday April 14th at 1100.  Sophy alejandre

## 2025-03-13 NOTE — PLAN OF CARE
Problem: ABCDS Injury Assessment  Goal: Absence of physical injury  Outcome: Progressing     Problem: Discharge Planning  Goal: Discharge to home or other facility with appropriate resources  Outcome: Progressing     Problem: Chronic Conditions and Co-morbidities  Goal: Patient's chronic conditions and co-morbidity symptoms are monitored and maintained or improved  Outcome: Progressing     Problem: Skin/Tissue Integrity  Goal: Skin integrity remains intact  Description: 1.  Monitor for areas of redness and/or skin breakdown  2.  Assess vascular access sites hourly  3.  Every 4-6 hours minimum:  Change oxygen saturation probe site  4.  Every 4-6 hours:  If on nasal continuous positive airway pressure, respiratory therapy assess nares and determine need for appliance change or resting period  Outcome: Progressing     Problem: Safety - Adult  Goal: Free from fall injury  Outcome: Progressing     Problem: Pain  Goal: Verbalizes/displays adequate comfort level or baseline comfort level  Outcome: Adequate for Discharge

## 2025-03-13 NOTE — PROGRESS NOTES
Educated pt on importance of using incentive spirometer and urinal for accurate outputs while on diuretic.

## 2025-03-13 NOTE — PROGRESS NOTES
INPATIENT CARDIOLOGY FOLLOW-UP    Name: Taye Hollis    Age: 55 y.o.    Date of Admission: 3/10/2025 11:27 AM    Date of Service: 3/13/2025    Primary Cardiologist: Neto    Chief Complaint: Follow-up for \"CHF\"    Interim History:  Denies chest pain breathing better.  Poor historian.  Needs to leave Saturday for his mother's .    VANESSA could not be done today because of anesthesia availability.    Echo showed high mitral valve gradients but valve not well-visualized.    Review of Systems:   Negative except as described above    Problem List:  Patient Active Problem List   Diagnosis    Chronic osteomyelitis of right tibia (HCC)    H/O mitral valve replacement with mechanical valve    Acute on chronic respiratory failure with hypoxia and hypercapnia (HCC)    COPD exacerbation (HCC)    CHF exacerbation (HCC)    Somnolence    Acute respiratory failure with hypoxia and hypercapnia (HCC)    Acute on chronic diastolic heart failure (HCC)    Subtherapeutic international normalized ratio (INR)    Warfarin anticoagulation    Coronary artery disease involving native coronary artery of native heart without angina pectoris    Normally functioning cardiac pacemaker present       Current Medications:    Current Facility-Administered Medications:     [START ON 3/14/2025] methylPREDNISolone sodium succ (SOLU-MEDROL) injection 40 mg, 40 mg, IntraVENous, Daily, Vu Thomas MD    warfarin (COUMADIN) tablet 5 mg, 5 mg, Oral, Once, Newton García MD    insulin glargine (LANTUS) injection vial 15 Units, 15 Units, SubCUTAneous, Nightly, Vu Thomas MD    insulin lispro (HUMALOG,ADMELOG) injection vial 0-16 Units, 0-16 Units, SubCUTAneous, 4x Daily AC & HS, Vu Thomas MD, 16 Units at 25 1112    sulfur hexafluoride microspheres (LUMASON) 60.7-25 MG injection 2 mL, 2 mL, IntraVENous, ONCE PRN, Hina Gamez APRN - CNP    flumazenil (ROMAZICON) injection 0.2 mg, 0.2 mg, IntraVENous, PRN, Shania  patent.  LAD 40% eccentric proximal stenosis just before moderate-sized first diagonal.  Mild diffuse disease throughout the mid LAD up to 20%.  Distal LAD tapers with mild to moderate diffuse disease.  LCx 30% throughout the mid vessel.  RCA 20% proximal stenosis.  Pre-existing stent widely patent.  Moderate PHTN.  Severely elevated filling pressures.The RA is 16 mmHg with a   pulmonary capillary wedge pressure of 37 mmHg     [facility] 2/2022: MACRINA to RCA   ----------------------------------------------------------------------------------------------------------------------------------------------------------------  IMPRESSION:  Acute on chronic heart failure recovered EF proBNP 2200-780 net 1.5 L  Mixed cardiomyopathy ischemic/valvular improved EF 60-65%  CAD PCI/MACRINA to RCA 2022  On-X mechanical mitral valve 2014  Redo sternotomy for MV thrombectomy 8/2023 [facility]  Elevated mitral gradient 11 mmHg concerning for recurrent valve obstruction/thrombosis  [facility] dual-chamber PPM 2015  Subtherapeutic INR 1.5-1.4  Pulmonary hypertension likely WHO group 2 and 3  Acute on chronic hypoxic/hypercapnic respiratory failure  COPD exacerbation  Hypertension  Type 2 diabetes A1c 6.3%  Obesity BMI 32 kg/m²    RECOMMENDATIONS:  Very high risk of recurrent mitral valve thrombosis with noncompliance with antiplatelet/anticoagulation and no INR follow-up, and I am concerned about the elevated gradients.    Recommend VANESSA tomorrow now scheduled for 3/14  Transition IV to oral diuretics  Continue enoxaparin while subtherapeutic including as outpatient  Would target warfarin goal INR 3.0-3.5  Continue clopidogrel  Aggressive risk factor modification  Further care per primary service and consultants    Risks and benefits of transesophageal echocardiogram explained to patient, including but not limited to risk of esophageal perforation, respiratory failure, and rarely death. They voiced understanding and agree to proceed.    Avtar Elder MD,

## 2025-03-13 NOTE — ANESTHESIA PRE PROCEDURE
Department of Anesthesiology  Preprocedure Note       Name:  Taye Hollis   Age:  55 y.o.  :  1970                                          MRN:  [id number]         Date:  3/13/2025      Surgeon: Cardiology    Procedure: VANESSA    Medications prior to admission:   Prior to Admission medications    Medication Sig Start Date End Date Taking? Authorizing Provider   albuterol sulfate HFA (PROVENTIL;VENTOLIN;PROAIR) 108 (90 Base) MCG/ACT inhaler Inhale 1 puff into the lungs every 4 hours as needed for Wheezing or Shortness of Breath    Tabatha Rivera MD   clopidogrel (PLAVIX) 75 MG tablet Take 1 tablet by mouth daily    Tabatha Rivera MD   furosemide (LASIX) 20 MG tablet Take 1 tablet by mouth daily    Tabatha Rivera MD   gabapentin (NEURONTIN) 400 MG capsule Take 1 capsule by mouth 3 times daily.    Tabatha Rivera MD   glimepiride (AMARYL) 1 MG tablet Take 1 tablet by mouth every morning    Tabatha Rivera MD   HYDROcodone-acetaminophen (NORCO) 7.5-325 MG per tablet Take 1 tablet by mouth every 6 hours as needed for Pain.    Tabatha Rivera MD   methylphenidate (RITALIN) 20 MG tablet Take 1 tablet by mouth 2 times daily.    Tabatha Rivera MD   atorvastatin (LIPITOR) 80 MG tablet Take 1 tablet by mouth daily 24   Tabatha Rivera MD   diazePAM (VALIUM) 10 MG tablet Take 1 tablet by mouth 4 times daily as needed for Anxiety. 25   Tabatha Rivera MD   warfarin (COUMADIN) 2 MG tablet Take 1 tablet by mouth daily 24   Tabatha Rivera MD   sertraline (ZOLOFT) 100 MG tablet Take 1 tablet by mouth 2 times daily 17   Tabatha Rivera MD   losartan-hydrochlorothiazide (HYZAAR) 50-12.5 MG per tablet Take 1 tablet by mouth daily 17   Tabatha Rivera MD   metFORMIN (GLUCOPHAGE) 500 MG tablet Take 1 tablet by mouth 2 times daily (with meals)    Tabatha Rivera MD   tiotropium (SPIRIVA) 18 MCG inhalation capsule Inhale 1

## 2025-03-13 NOTE — PLAN OF CARE
Problem: ABCDS Injury Assessment  Goal: Absence of physical injury  3/12/2025 2343 by Odilia Jeffery RN  Outcome: Progressing     Problem: Discharge Planning  Goal: Discharge to home or other facility with appropriate resources  3/12/2025 2343 by Odilia Jeffery RN  Outcome: Progressing     Problem: Chronic Conditions and Co-morbidities  Goal: Patient's chronic conditions and co-morbidity symptoms are monitored and maintained or improved  3/12/2025 2343 by Odilia Jeffery RN  Outcome: Progressing     Problem: Pain  Goal: Verbalizes/displays adequate comfort level or baseline comfort level  3/12/2025 2343 by Odilia Jeffery RN  Outcome: Progressing     Problem: Skin/Tissue Integrity  Goal: Skin integrity remains intact  Description: 1.  Monitor for areas of redness and/or skin breakdown  2.  Assess vascular access sites hourly  3.  Every 4-6 hours minimum:  Change oxygen saturation probe site  4.  Every 4-6 hours:  If on nasal continuous positive airway pressure, respiratory therapy assess nares and determine need for appliance change or resting period  3/12/2025 2343 by Odilia Jeffery, RN  Outcome: Progressing     Problem: Safety - Adult  Goal: Free from fall injury  3/12/2025 2343 by Odilia Jeffery, RN  Outcome: Progressing

## 2025-03-14 ENCOUNTER — ANESTHESIA (OUTPATIENT)
Age: 55
End: 2025-03-14
Payer: MEDICARE

## 2025-03-14 ENCOUNTER — HOSPITAL ENCOUNTER (INPATIENT)
Age: 55
Discharge: HOME | DRG: 291 | End: 2025-03-16
Payer: MEDICARE

## 2025-03-14 VITALS
OXYGEN SATURATION: 84 % | SYSTOLIC BLOOD PRESSURE: 124 MMHG | RESPIRATION RATE: 16 BRPM | HEART RATE: 67 BPM | DIASTOLIC BLOOD PRESSURE: 69 MMHG

## 2025-03-14 LAB
ANION GAP SERPL CALCULATED.3IONS-SCNC: 9 MMOL/L (ref 7–16)
B.E.: 6.2 MMOL/L (ref -3–3)
BNP SERPL-MCNC: 1097 PG/ML (ref 0–125)
BUN SERPL-MCNC: 26 MG/DL (ref 6–20)
CALCIUM SERPL-MCNC: 8.7 MG/DL (ref 8.6–10.2)
CHLORIDE SERPL-SCNC: 95 MMOL/L (ref 98–107)
CO2 SERPL-SCNC: 34 MMOL/L (ref 22–29)
COHB: 1 % (ref 0–1.5)
CREAT SERPL-MCNC: 1.3 MG/DL (ref 0.7–1.2)
CRITICAL: ABNORMAL
DATE ANALYZED: ABNORMAL
DATE OF COLLECTION: ABNORMAL
EKG ATRIAL RATE: 71 BPM
EKG P AXIS: 64 DEGREES
EKG P-R INTERVAL: 178 MS
EKG Q-T INTERVAL: 418 MS
EKG QRS DURATION: 90 MS
EKG QTC CALCULATION (BAZETT): 454 MS
EKG R AXIS: 68 DEGREES
EKG T AXIS: 43 DEGREES
EKG VENTRICULAR RATE: 71 BPM
GFR, ESTIMATED: 68 ML/MIN/1.73M2
GLUCOSE BLD-MCNC: 126 MG/DL (ref 74–99)
GLUCOSE BLD-MCNC: 137 MG/DL (ref 74–99)
GLUCOSE BLD-MCNC: 335 MG/DL (ref 74–99)
GLUCOSE BLD-MCNC: 383 MG/DL (ref 74–99)
GLUCOSE SERPL-MCNC: 141 MG/DL (ref 74–99)
HCO3: 33 MMOL/L (ref 22–26)
HHB: 4.6 % (ref 0–5)
INR PPP: 1.7
LAB: ABNORMAL
Lab: 1440
MAGNESIUM SERPL-MCNC: 2.3 MG/DL (ref 1.6–2.6)
METHB: 0.3 % (ref 0–1.5)
MODE: ABNORMAL
O2 CONTENT: 20 ML/DL
O2 SATURATION: 95.3 % (ref 92–98.5)
O2HB: 94.1 % (ref 94–97)
OPERATOR ID: 1661
PATIENT TEMP: 37 C
PCO2: 56 MMHG (ref 35–45)
PH BLOOD GAS: 7.39 (ref 7.35–7.45)
PO2: 77.9 MMHG (ref 75–100)
POTASSIUM SERPL-SCNC: 3.4 MMOL/L (ref 3.5–5)
PROTHROMBIN TIME: 17.9 SEC (ref 9.3–12.4)
SODIUM SERPL-SCNC: 138 MMOL/L (ref 132–146)
SOURCE, BLOOD GAS: ABNORMAL
THB: 15.1 G/DL (ref 11.5–16.5)
TIME ANALYZED: 1442

## 2025-03-14 PROCEDURE — 82805 BLOOD GASES W/O2 SATURATION: CPT

## 2025-03-14 PROCEDURE — 83880 ASSAY OF NATRIURETIC PEPTIDE: CPT

## 2025-03-14 PROCEDURE — 2500000003 HC RX 250 WO HCPCS: Performed by: STUDENT IN AN ORGANIZED HEALTH CARE EDUCATION/TRAINING PROGRAM

## 2025-03-14 PROCEDURE — 94640 AIRWAY INHALATION TREATMENT: CPT

## 2025-03-14 PROCEDURE — 82962 GLUCOSE BLOOD TEST: CPT

## 2025-03-14 PROCEDURE — 99233 SBSQ HOSP IP/OBS HIGH 50: CPT | Performed by: INTERNAL MEDICINE

## 2025-03-14 PROCEDURE — 6360000002 HC RX W HCPCS: Performed by: STUDENT IN AN ORGANIZED HEALTH CARE EDUCATION/TRAINING PROGRAM

## 2025-03-14 PROCEDURE — 6370000000 HC RX 637 (ALT 250 FOR IP): Performed by: STUDENT IN AN ORGANIZED HEALTH CARE EDUCATION/TRAINING PROGRAM

## 2025-03-14 PROCEDURE — 6370000000 HC RX 637 (ALT 250 FOR IP)

## 2025-03-14 PROCEDURE — 2500000003 HC RX 250 WO HCPCS

## 2025-03-14 PROCEDURE — 2060000000 HC ICU INTERMEDIATE R&B

## 2025-03-14 PROCEDURE — 2500000003 HC RX 250 WO HCPCS: Performed by: ANESTHESIOLOGY

## 2025-03-14 PROCEDURE — 99232 SBSQ HOSP IP/OBS MODERATE 35: CPT | Performed by: INTERNAL MEDICINE

## 2025-03-14 PROCEDURE — 83735 ASSAY OF MAGNESIUM: CPT

## 2025-03-14 PROCEDURE — 94660 CPAP INITIATION&MGMT: CPT

## 2025-03-14 PROCEDURE — 80048 BASIC METABOLIC PNL TOTAL CA: CPT

## 2025-03-14 PROCEDURE — 6360000002 HC RX W HCPCS

## 2025-03-14 PROCEDURE — 85610 PROTHROMBIN TIME: CPT

## 2025-03-14 PROCEDURE — 2700000000 HC OXYGEN THERAPY PER DAY

## 2025-03-14 RX ORDER — LABETALOL HYDROCHLORIDE 5 MG/ML
5 INJECTION, SOLUTION INTRAVENOUS
Status: CANCELLED | OUTPATIENT
Start: 2025-03-14

## 2025-03-14 RX ORDER — PROCHLORPERAZINE EDISYLATE 5 MG/ML
5 INJECTION INTRAMUSCULAR; INTRAVENOUS
Status: DISCONTINUED | OUTPATIENT
Start: 2025-03-14 | End: 2025-03-15 | Stop reason: HOSPADM

## 2025-03-14 RX ORDER — SODIUM CHLORIDE 0.9 % (FLUSH) 0.9 %
5-40 SYRINGE (ML) INJECTION PRN
Status: DISCONTINUED | OUTPATIENT
Start: 2025-03-14 | End: 2025-03-15 | Stop reason: HOSPADM

## 2025-03-14 RX ORDER — SODIUM CHLORIDE 0.9 % (FLUSH) 0.9 %
5-40 SYRINGE (ML) INJECTION EVERY 12 HOURS SCHEDULED
Status: DISCONTINUED | OUTPATIENT
Start: 2025-03-14 | End: 2025-03-15 | Stop reason: HOSPADM

## 2025-03-14 RX ORDER — HYDRALAZINE HYDROCHLORIDE 20 MG/ML
5 INJECTION INTRAMUSCULAR; INTRAVENOUS
Status: CANCELLED | OUTPATIENT
Start: 2025-03-14

## 2025-03-14 RX ORDER — WARFARIN SODIUM 5 MG/1
5 TABLET ORAL
Status: COMPLETED | OUTPATIENT
Start: 2025-03-14 | End: 2025-03-14

## 2025-03-14 RX ORDER — FENTANYL CITRATE 50 UG/ML
25 INJECTION, SOLUTION INTRAMUSCULAR; INTRAVENOUS EVERY 5 MIN PRN
Refills: 0 | Status: CANCELLED | OUTPATIENT
Start: 2025-03-14

## 2025-03-14 RX ORDER — SODIUM CHLORIDE 0.9 % (FLUSH) 0.9 %
5-40 SYRINGE (ML) INJECTION EVERY 12 HOURS SCHEDULED
Status: CANCELLED | OUTPATIENT
Start: 2025-03-14

## 2025-03-14 RX ORDER — PROCHLORPERAZINE EDISYLATE 5 MG/ML
5 INJECTION INTRAMUSCULAR; INTRAVENOUS
Status: CANCELLED | OUTPATIENT
Start: 2025-03-14 | End: 2025-03-15

## 2025-03-14 RX ORDER — SODIUM CHLORIDE 9 MG/ML
INJECTION, SOLUTION INTRAVENOUS PRN
Status: DISCONTINUED | OUTPATIENT
Start: 2025-03-14 | End: 2025-03-15 | Stop reason: HOSPADM

## 2025-03-14 RX ORDER — HYDRALAZINE HYDROCHLORIDE 20 MG/ML
5 INJECTION INTRAMUSCULAR; INTRAVENOUS
Status: DISCONTINUED | OUTPATIENT
Start: 2025-03-14 | End: 2025-03-15 | Stop reason: HOSPADM

## 2025-03-14 RX ORDER — SODIUM CHLORIDE 0.9 % (FLUSH) 0.9 %
5-40 SYRINGE (ML) INJECTION PRN
Status: CANCELLED | OUTPATIENT
Start: 2025-03-14

## 2025-03-14 RX ORDER — LABETALOL HYDROCHLORIDE 5 MG/ML
5 INJECTION, SOLUTION INTRAVENOUS
Status: DISCONTINUED | OUTPATIENT
Start: 2025-03-14 | End: 2025-03-15 | Stop reason: HOSPADM

## 2025-03-14 RX ORDER — SODIUM CHLORIDE 9 MG/ML
INJECTION, SOLUTION INTRAVENOUS PRN
Status: CANCELLED | OUTPATIENT
Start: 2025-03-14

## 2025-03-14 RX ORDER — FENTANYL CITRATE 50 UG/ML
25 INJECTION, SOLUTION INTRAMUSCULAR; INTRAVENOUS EVERY 5 MIN PRN
Refills: 0 | Status: DISCONTINUED | OUTPATIENT
Start: 2025-03-14 | End: 2025-03-15 | Stop reason: HOSPADM

## 2025-03-14 RX ORDER — NALOXONE HYDROCHLORIDE 0.4 MG/ML
INJECTION, SOLUTION INTRAMUSCULAR; INTRAVENOUS; SUBCUTANEOUS PRN
Status: DISCONTINUED | OUTPATIENT
Start: 2025-03-14 | End: 2025-03-15 | Stop reason: HOSPADM

## 2025-03-14 RX ORDER — NALOXONE HYDROCHLORIDE 0.4 MG/ML
INJECTION, SOLUTION INTRAMUSCULAR; INTRAVENOUS; SUBCUTANEOUS PRN
Status: CANCELLED | OUTPATIENT
Start: 2025-03-14

## 2025-03-14 RX ORDER — MEPERIDINE HYDROCHLORIDE 50 MG/ML
12.5 INJECTION INTRAMUSCULAR; INTRAVENOUS; SUBCUTANEOUS ONCE
Refills: 0 | Status: CANCELLED | OUTPATIENT
Start: 2025-03-14 | End: 2025-03-14

## 2025-03-14 RX ORDER — MEPERIDINE HYDROCHLORIDE 50 MG/ML
12.5 INJECTION INTRAMUSCULAR; INTRAVENOUS; SUBCUTANEOUS ONCE
Refills: 0 | Status: DISCONTINUED | OUTPATIENT
Start: 2025-03-14 | End: 2025-03-15 | Stop reason: HOSPADM

## 2025-03-14 RX ADMIN — SERTRALINE 100 MG: 100 TABLET, FILM COATED ORAL at 21:27

## 2025-03-14 RX ADMIN — GABAPENTIN 400 MG: 400 CAPSULE ORAL at 21:27

## 2025-03-14 RX ADMIN — SODIUM CHLORIDE, PRESERVATIVE FREE 10 ML: 5 INJECTION INTRAVENOUS at 10:17

## 2025-03-14 RX ADMIN — INSULIN LISPRO 12 UNITS: 100 INJECTION, SOLUTION INTRAVENOUS; SUBCUTANEOUS at 17:37

## 2025-03-14 RX ADMIN — WATER 1000 MG: 1 INJECTION INTRAMUSCULAR; INTRAVENOUS; SUBCUTANEOUS at 17:36

## 2025-03-14 RX ADMIN — FUROSEMIDE 40 MG: 10 INJECTION, SOLUTION INTRAMUSCULAR; INTRAVENOUS at 17:36

## 2025-03-14 RX ADMIN — SODIUM CHLORIDE, PRESERVATIVE FREE 10 ML: 5 INJECTION INTRAVENOUS at 23:30

## 2025-03-14 RX ADMIN — INSULIN GLARGINE 15 UNITS: 100 INJECTION, SOLUTION SUBCUTANEOUS at 21:29

## 2025-03-14 RX ADMIN — METHYLPREDNISOLONE SODIUM SUCCINATE 40 MG: 40 INJECTION INTRAMUSCULAR; INTRAVENOUS at 10:15

## 2025-03-14 RX ADMIN — IPRATROPIUM BROMIDE AND ALBUTEROL SULFATE 1 DOSE: 2.5; .5 SOLUTION RESPIRATORY (INHALATION) at 09:40

## 2025-03-14 RX ADMIN — WARFARIN SODIUM 5 MG: 5 TABLET ORAL at 17:35

## 2025-03-14 RX ADMIN — ENOXAPARIN SODIUM 120 MG: 150 INJECTION SUBCUTANEOUS at 17:37

## 2025-03-14 RX ADMIN — POTASSIUM CHLORIDE 40 MEQ: 1500 TABLET, EXTENDED RELEASE ORAL at 10:26

## 2025-03-14 RX ADMIN — GABAPENTIN 400 MG: 400 CAPSULE ORAL at 10:15

## 2025-03-14 RX ADMIN — FUROSEMIDE 40 MG: 10 INJECTION, SOLUTION INTRAMUSCULAR; INTRAVENOUS at 10:15

## 2025-03-14 RX ADMIN — ATORVASTATIN CALCIUM 80 MG: 40 TABLET, FILM COATED ORAL at 10:15

## 2025-03-14 RX ADMIN — ARFORMOTEROL TARTRATE: 15 SOLUTION RESPIRATORY (INHALATION) at 19:51

## 2025-03-14 RX ADMIN — INSULIN LISPRO 16 UNITS: 100 INJECTION, SOLUTION INTRAVENOUS; SUBCUTANEOUS at 21:29

## 2025-03-14 RX ADMIN — IPRATROPIUM BROMIDE AND ALBUTEROL SULFATE 1 DOSE: 2.5; .5 SOLUTION RESPIRATORY (INHALATION) at 15:49

## 2025-03-14 RX ADMIN — METHYLPHENIDATE HYDROCHLORIDE 20 MG: 10 TABLET ORAL at 21:27

## 2025-03-14 RX ADMIN — IPRATROPIUM BROMIDE AND ALBUTEROL SULFATE 1 DOSE: 2.5; .5 SOLUTION RESPIRATORY (INHALATION) at 19:51

## 2025-03-14 RX ADMIN — GABAPENTIN 400 MG: 400 CAPSULE ORAL at 15:13

## 2025-03-14 RX ADMIN — SERTRALINE 100 MG: 100 TABLET, FILM COATED ORAL at 10:15

## 2025-03-14 RX ADMIN — ARFORMOTEROL TARTRATE: 15 SOLUTION RESPIRATORY (INHALATION) at 09:40

## 2025-03-14 RX ADMIN — ENOXAPARIN SODIUM 120 MG: 150 INJECTION SUBCUTANEOUS at 05:30

## 2025-03-14 ASSESSMENT — PAIN SCALES - GENERAL: PAINLEVEL_OUTOF10: 0

## 2025-03-14 NOTE — PLAN OF CARE
Problem: ABCDS Injury Assessment  Goal: Absence of physical injury  3/14/2025 1448 by Lianne Xavier RN  Outcome: Progressing     Problem: Discharge Planning  Goal: Discharge to home or other facility with appropriate resources  3/14/2025 1448 by Lianne Xavier RN  Outcome: Progressing     Problem: Chronic Conditions and Co-morbidities  Goal: Patient's chronic conditions and co-morbidity symptoms are monitored and maintained or improved  3/14/2025 1448 by Lianne Xavier RN  Outcome: Progressing

## 2025-03-14 NOTE — PROGRESS NOTES
Pulse ox was 95% on room air at rest.   Ambulated patient on room air.   Oxygen saturation was 85% on room air while ambulating.   Oxygen applied, 2L.  Recovery pulse ox was 95% on 2 liters of oxygen while ambulating

## 2025-03-14 NOTE — PROGRESS NOTES
Hospitalist Progress Note      SYNOPSIS: Patient admitted on 3/10/2025 for Acute on chronic respiratory failure with hypoxia and hypercapnia (HCC)  Patient is being followed for Shortness of breath [R06.02]  Acute respiratory failure with hypoxia and hypercapnia (HCC) [J96.01, J96.02]  Acute on chronic respiratory failure with hypoxia and hypercapnia (HCC) [J96.21, J96.22]  Chronic obstructive pulmonary disease, unspecified COPD type (HCC) [J44.9]     SUBJECTIVE:  Feeling OK   No CP or SOB  No fever or chills   No uncontrolled pain  No vomiting or diarrhea   No events reported overnight  Chart reviewed       Temp (24hrs), Av.8 °F (36.6 °C), Min:97.5 °F (36.4 °C), Max:98 °F (36.7 °C)    DIET: Diet NPO Exceptions are: Sips of Water with Meds  CODE: Full Code    Intake/Output Summary (Last 24 hours) at 3/14/2025 1042  Last data filed at 3/13/2025 2337  Gross per 24 hour   Intake --   Output 2200 ml   Net -2200 ml         OBJECTIVE:    /88   Pulse 69   Temp 97.5 °F (36.4 °C) (Axillary)   Resp 18   Ht 1.93 m (6' 4\")   Wt 119.3 kg (263 lb)   SpO2 96%   BMI 32.01 kg/m²     General appearance:  awake, alert, and oriented to person, place, time, and purpose; appears stated age and cooperative; no apparent distress no labored breathing  HEENT:  Conjunctivae/corneas clear.   Neck: Supple. No jugular venous distention.   Respiratory: symmetrical; clear to auscultation bilaterally; no wheezes; no rhonchi; no rales  Cardiovascular: rhythm regular; rate controlled; no murmurs  Abdomen: Soft, nontender, nondistended  Extremities:  peripheral pulses present; no peripheral edema; no ulcers  Musculoskeletal:  +2 bilateral lower extremity edema.  Wound right lower extremity. edema. Brisk capillary refill.   Skin:  No rashes  on visible skin  Neurologic: awake, alert and following commands     ASSESSMENT and PLAN:  Acute on chronic respiratory failure with hypoxia and hypercapnia-heart failure exacerbation vs. COPD  03/12/25  0158 03/13/25  0143 03/14/25  0530   INR 1.5 1.4 1.7       No results for input(s): \"CKTOTAL\", \"TROPONINI\" in the last 72 hours.    Chronic labs:    Lab Results   Component Value Date    CHOL 136 03/11/2025    TRIG 101 03/11/2025    HDL 45 03/11/2025    TSH 1.84 03/10/2025    INR 1.7 03/14/2025    LABA1C 6.3 (H) 03/11/2025       Radiology: REVIEWED DAILY    +++++++++++++++++++++++++++++++++++++++++++++++++  Lv Peterson MD   Hospitalist  [place], [place]  +++++++++++++++++++++++++++++++++++++++++++++++++  NOTE: This report was transcribed using voice recognition software. Every effort was made to ensure accuracy; however, inadvertent computerized transcription errors may be present.

## 2025-03-14 NOTE — PROGRESS NOTES
Pharmacy Consultation Note  (Warfarin Dosing and Monitoring)    Initial consult date: 3/10/2025  Consulting Provider: Dr. Newton García    Taye Hollis is a 55 y.o. male for whom pharmacy has been asked to manage warfarin therapy.     Weight:   Wt Readings from Last 1 Encounters:   03/13/25 119.3 kg (263 lb)       TSH:    Lab Results   Component Value Date/Time    TSH 1.84 03/10/2025 12:13 PM       Hepatic Function Panel:                          Lab Results   Component Value Date/Time    ALKPHOS 111 03/11/2025 05:00 AM    ALT 17 03/11/2025 05:00 AM    AST 17 03/11/2025 05:00 AM    BILITOT 0.5 03/11/2025 05:00 AM    BILIDIR <0.2 03/11/2025 05:00 AM    IBILI Can not be calculated 03/11/2025 05:00 AM       Current significant warfarin drug-drug interactions include:   Ceftiaxone, methylprednisolone: may enhance anticoagulatory effect of warfarin    No results for input(s): \"HGB\", \"PLT\" in the last 72 hours.    Date Warfarin Dose INR Heparin or LMWH Comment   3/10 2 mg (already taken this morning)  -----------  1.5 Enoxaparin 120 mg subQ twice daily    3/11 4 mg 1.5 Enoxaparin 120 mg subQ twice daily    3/12 4 mg 1.5 Enoxaparin 120 mg subQ twice daily    3/13 5 mg 1.4 Enoxaparin 120 mg subQ twice daily    3/14 5 mg 1.7 Enoxaparin 120 mg subQ twice daily                           Assessment:  Patient is a 55 y.o. male on warfarin for Mechanical Heart Valve (mitral).  Patient's home warfarin dosing regimen is documented as warfarin 2 mg daily  Goal INR 2.5 - 3.5  INR 1.7 today  Currently ordered enoxaparin as bridge anticoagulation    Plan:  Will give warfarin 5 mg again tonight  Continue enoxaparin until INR >2.5  Daily PT/INR until the INR is stable within the therapeutic range  Pharmacist will follow and monitor/adjust dosing as necessary    Alexey Nunez, PharmD, [facility] Center[facility]  3/14/2025  1:15 PM    LISA: [phone]  SEY: [phone]  SJW: [phone]

## 2025-03-14 NOTE — PROGRESS NOTES
INPATIENT CARDIOLOGY FOLLOW-UP    Name: Taye Hollis    Age: 55 y.o.    Date of Admission: 3/10/2025 11:27 AM    Date of Service: 3/14/2025    Primary Cardiologist: Neto    Chief Complaint: Follow-up for \"CHF\"    Interim History:  Denies chest pain breathing better.  Poor historian.  Needs to leave Saturday for his mother's .    VANESSA today.    Echo showed high mitral valve gradients but valve not well-visualized.    Review of Systems:   Negative except as described above    Problem List:  Patient Active Problem List   Diagnosis    Chronic osteomyelitis of right tibia (HCC)    H/O mitral valve replacement with mechanical valve    Acute on chronic respiratory failure with hypoxia and hypercapnia (HCC)    COPD exacerbation (HCC)    CHF exacerbation (HCC)    Somnolence    Acute respiratory failure with hypoxia and hypercapnia (HCC)    Acute on chronic diastolic heart failure (HCC)    Subtherapeutic international normalized ratio (INR)    Warfarin anticoagulation    Coronary artery disease involving native coronary artery of native heart without angina pectoris    Normally functioning cardiac pacemaker present       Current Medications:    Current Facility-Administered Medications:     methylPREDNISolone sodium succ (SOLU-MEDROL) injection 40 mg, 40 mg, IntraVENous, Daily, Vu Thomas MD, 40 mg at 25 1015    insulin glargine (LANTUS) injection vial 15 Units, 15 Units, SubCUTAneous, Nightly, Vu Thomas MD, 15 Units at 25    insulin lispro (HUMALOG,ADMELOG) injection vial 0-16 Units, 0-16 Units, SubCUTAneous, 4x Daily AC & HS, Vu Thomas MD, 12 Units at 25    sulfur hexafluoride microspheres (LUMASON) 60.7-25 MG injection 2 mL, 2 mL, IntraVENous, ONCE PRN, Hina Gamez APRN - CNP    flumazenil (ROMAZICON) injection 0.2 mg, 0.2 mg, IntraVENous, PRN, Mirian Jauregui APRN - CNP    sodium chloride flush 0.9 % injection 5-40 mL, 5-40 mL, IntraVENous, 2

## 2025-03-14 NOTE — PROGRESS NOTES
VANESSA canceled per Dr. Hollingsworth d/t hypercabnea (60-65).  On simple mask 10 L. Appears much drowsier than previously noted.

## 2025-03-14 NOTE — CARE COORDINATION
Social Work/Discharge Planning:  Patient states he was not able to have VANESSA.  RN states patient may leave AMA, since his mother's  is tomorrow.  Called liaison Elissa with [employer] and confirmed they will still be able to supply oxygen if patient decides to leave AMA, but will need an order.  Unit can call Elissa (ph:  [phone]) if patient qualifies for oxygen.  INR device will be delivered to patient home once approved by patient insurance.  Updated RN.  Will continue to follow.  Electronically signed by ROBERTO De Jesus on 3/14/2025 at 2:56 PM    Addendum:  Elissa with [employer] notified of oxygen order and she will deliver oxygen tank to patient hospital room.  Electronically signed by ROBERTO De Jesus on 3/14/2025 at 3:43 PM

## 2025-03-15 VITALS
WEIGHT: 265.21 LBS | RESPIRATION RATE: 18 BRPM | TEMPERATURE: 97.9 F | OXYGEN SATURATION: 97 % | HEART RATE: 71 BPM | DIASTOLIC BLOOD PRESSURE: 73 MMHG | SYSTOLIC BLOOD PRESSURE: 128 MMHG | HEIGHT: 76 IN | BODY MASS INDEX: 32.3 KG/M2

## 2025-03-15 LAB — GLUCOSE BLD-MCNC: 192 MG/DL (ref 74–99)

## 2025-03-15 PROCEDURE — 6360000002 HC RX W HCPCS: Performed by: STUDENT IN AN ORGANIZED HEALTH CARE EDUCATION/TRAINING PROGRAM

## 2025-03-15 PROCEDURE — 6370000000 HC RX 637 (ALT 250 FOR IP): Performed by: STUDENT IN AN ORGANIZED HEALTH CARE EDUCATION/TRAINING PROGRAM

## 2025-03-15 PROCEDURE — 2700000000 HC OXYGEN THERAPY PER DAY

## 2025-03-15 PROCEDURE — 6370000000 HC RX 637 (ALT 250 FOR IP)

## 2025-03-15 PROCEDURE — 94660 CPAP INITIATION&MGMT: CPT

## 2025-03-15 PROCEDURE — 82962 GLUCOSE BLOOD TEST: CPT

## 2025-03-15 PROCEDURE — 94640 AIRWAY INHALATION TREATMENT: CPT

## 2025-03-15 RX ADMIN — INSULIN LISPRO 4 UNITS: 100 INJECTION, SOLUTION INTRAVENOUS; SUBCUTANEOUS at 06:03

## 2025-03-15 RX ADMIN — IPRATROPIUM BROMIDE AND ALBUTEROL SULFATE 1 DOSE: 2.5; .5 SOLUTION RESPIRATORY (INHALATION) at 08:13

## 2025-03-15 RX ADMIN — ENOXAPARIN SODIUM 120 MG: 150 INJECTION SUBCUTANEOUS at 06:03

## 2025-03-15 RX ADMIN — ARFORMOTEROL TARTRATE: 15 SOLUTION RESPIRATORY (INHALATION) at 08:13
